# Patient Record
Sex: MALE | Race: WHITE | NOT HISPANIC OR LATINO | Employment: OTHER | URBAN - METROPOLITAN AREA
[De-identification: names, ages, dates, MRNs, and addresses within clinical notes are randomized per-mention and may not be internally consistent; named-entity substitution may affect disease eponyms.]

---

## 2017-08-17 DIAGNOSIS — Z12.5 ENCOUNTER FOR SCREENING FOR MALIGNANT NEOPLASM OF PROSTATE: ICD-10-CM

## 2017-08-17 DIAGNOSIS — E78.89 OTHER LIPOPROTEIN METABOLISM DISORDERS: ICD-10-CM

## 2017-08-17 DIAGNOSIS — R94.6 ABNORMAL RESULTS OF THYROID FUNCTION STUDIES: ICD-10-CM

## 2017-08-18 ENCOUNTER — LAB (OUTPATIENT)
Dept: LAB | Facility: MEDICAL CENTER | Age: 77
End: 2017-08-18
Payer: COMMERCIAL

## 2017-08-18 DIAGNOSIS — R94.6 ABNORMAL RESULTS OF THYROID FUNCTION STUDIES: ICD-10-CM

## 2017-08-18 DIAGNOSIS — Z12.5 ENCOUNTER FOR SCREENING FOR MALIGNANT NEOPLASM OF PROSTATE: ICD-10-CM

## 2017-08-18 DIAGNOSIS — E78.89 OTHER LIPOPROTEIN METABOLISM DISORDERS: ICD-10-CM

## 2017-08-18 LAB
ALBUMIN SERPL BCP-MCNC: 3.4 G/DL (ref 3.5–5)
ALP SERPL-CCNC: 50 U/L (ref 46–116)
ALT SERPL W P-5'-P-CCNC: 28 U/L (ref 12–78)
ANION GAP SERPL CALCULATED.3IONS-SCNC: 4 MMOL/L (ref 4–13)
AST SERPL W P-5'-P-CCNC: 23 U/L (ref 5–45)
BASOPHILS # BLD AUTO: 0.08 THOUSANDS/ΜL (ref 0–0.1)
BASOPHILS NFR BLD AUTO: 2 % (ref 0–1)
BILIRUB SERPL-MCNC: 0.6 MG/DL (ref 0.2–1)
BUN SERPL-MCNC: 19 MG/DL (ref 5–25)
CALCIUM SERPL-MCNC: 8.6 MG/DL (ref 8.3–10.1)
CHLORIDE SERPL-SCNC: 105 MMOL/L (ref 100–108)
CHOLEST SERPL-MCNC: 176 MG/DL (ref 50–200)
CO2 SERPL-SCNC: 28 MMOL/L (ref 21–32)
CREAT SERPL-MCNC: 0.66 MG/DL (ref 0.6–1.3)
EOSINOPHIL # BLD AUTO: 0.25 THOUSAND/ΜL (ref 0–0.61)
EOSINOPHIL NFR BLD AUTO: 5 % (ref 0–6)
ERYTHROCYTE [DISTWIDTH] IN BLOOD BY AUTOMATED COUNT: 13.9 % (ref 11.6–15.1)
GFR SERPL CREATININE-BSD FRML MDRD: 95 ML/MIN/1.73SQ M
GLUCOSE P FAST SERPL-MCNC: 82 MG/DL (ref 65–99)
HCT VFR BLD AUTO: 42.1 % (ref 36.5–49.3)
HDLC SERPL-MCNC: 81 MG/DL (ref 40–60)
HGB BLD-MCNC: 13.9 G/DL (ref 12–17)
LDLC SERPL CALC-MCNC: 86 MG/DL (ref 0–100)
LYMPHOCYTES # BLD AUTO: 0.87 THOUSANDS/ΜL (ref 0.6–4.47)
LYMPHOCYTES NFR BLD AUTO: 18 % (ref 14–44)
MCH RBC QN AUTO: 29.6 PG (ref 26.8–34.3)
MCHC RBC AUTO-ENTMCNC: 33 G/DL (ref 31.4–37.4)
MCV RBC AUTO: 90 FL (ref 82–98)
MONOCYTES # BLD AUTO: 0.66 THOUSAND/ΜL (ref 0.17–1.22)
MONOCYTES NFR BLD AUTO: 14 % (ref 4–12)
NEUTROPHILS # BLD AUTO: 2.95 THOUSANDS/ΜL (ref 1.85–7.62)
NEUTS SEG NFR BLD AUTO: 61 % (ref 43–75)
NRBC BLD AUTO-RTO: 0 /100 WBCS
PLATELET # BLD AUTO: 233 THOUSANDS/UL (ref 149–390)
PMV BLD AUTO: 10.1 FL (ref 8.9–12.7)
POTASSIUM SERPL-SCNC: 4.2 MMOL/L (ref 3.5–5.3)
PROT SERPL-MCNC: 6.7 G/DL (ref 6.4–8.2)
PSA SERPL-MCNC: 1.7 NG/ML (ref 0–4)
RBC # BLD AUTO: 4.7 MILLION/UL (ref 3.88–5.62)
SODIUM SERPL-SCNC: 137 MMOL/L (ref 136–145)
TRIGL SERPL-MCNC: 43 MG/DL
TSH SERPL DL<=0.05 MIU/L-ACNC: 4.93 UIU/ML (ref 0.36–3.74)
WBC # BLD AUTO: 4.82 THOUSAND/UL (ref 4.31–10.16)

## 2017-08-18 PROCEDURE — 36415 COLL VENOUS BLD VENIPUNCTURE: CPT

## 2017-08-18 PROCEDURE — 80053 COMPREHEN METABOLIC PANEL: CPT

## 2017-08-18 PROCEDURE — G0103 PSA SCREENING: HCPCS

## 2017-08-18 PROCEDURE — 84443 ASSAY THYROID STIM HORMONE: CPT

## 2017-08-18 PROCEDURE — 80061 LIPID PANEL: CPT

## 2017-08-18 PROCEDURE — 85025 COMPLETE CBC W/AUTO DIFF WBC: CPT

## 2017-08-28 ENCOUNTER — ALLSCRIPTS OFFICE VISIT (OUTPATIENT)
Dept: OTHER | Facility: OTHER | Age: 77
End: 2017-08-28

## 2017-08-31 ENCOUNTER — ALLSCRIPTS OFFICE VISIT (OUTPATIENT)
Dept: OTHER | Facility: OTHER | Age: 77
End: 2017-08-31

## 2017-10-18 ENCOUNTER — ALLSCRIPTS OFFICE VISIT (OUTPATIENT)
Dept: OTHER | Facility: OTHER | Age: 77
End: 2017-10-18

## 2017-10-24 NOTE — CONSULTS
Assessment  1  Benign prostatic hyperplasia (BPH) with urinary urge incontinence (600 01,788 31)   (N40 1,N39 41)    Plan  Benign prostatic hyperplasia (BPH) with urinary urge incontinence    · Oxybutynin Chloride ER 10 MG Oral Tablet Extended Release 24 Hour; Take 1  tablet daily   Rx By: Socorro Lainez; Dispense: 30 Days ; #:30 Tablet Extended Release 24 Hour; Refill: 1;For: Benign prostatic hyperplasia (BPH) with urinary urge incontinence; NAT = N; Verified Transmission to [address] [place] [address]; Last Updated By: System, SureScript[employer]; 10/18/2017 3:07:49 PM    Discussion/Summary  Discussion Summary:   60-year-old male with BPH and lower urinary tract symptoms  is to continue the tamsulosin  I will add oxybutynin for his overactive symptoms  Side effects were discussed  He will follow up in 6 weeks to re-evaluate his symptoms on the new medication  Self Referrals:   Self Referrals: No By Adin Jim      Chief Complaint  Chief Complaint Free Text Note Form: BPH= 1 7 (8/18/2017)      History of Present Illness  HPI: 60-year-old male presents for evaluation of BPH  The patient has had symptoms for several years but recently has been having worsening urgency and now occasional urge incontinence  He is not wearing any pads  He has been on tamsulosin and saw palmetto for well over a year  He denies any dysuria or gross hematuria  His PSAs have been within normal limits  He has no other complaints  Review of Systems  Complete-Male Urology:   Constitutional: No fever or chills, feels well, no tiredness, no recent weight gain or weight loss  Respiratory: No complaints of shortness of breath, no wheezing, no cough, no SOB on exertion, no orthopnea or PND  Cardiovascular: No complaints of slow heart rate, no fast heart rate, no chest pain, no palpitations, no leg claudication, no lower extremity     Gastrointestinal: No complaints of abdominal pain, no constipation, no nausea or vomiting, no diarrhea or bloody stools  Genitourinary: stream varies,-- Empty sensation-- (not always),-- incontinence-- (occ / no pads),-- feelings of urinary urgency-- (occ)-- and-- stream quality fair, but-- no dysuria,-- no urinary hesitancy-- and-- no hematuria--    The patient presents with complaints of nocturia (3/5(varies))  Musculoskeletal: No complaints of arthralgia, no myalgias, no joint swelling or stiffness, no limb pain or swelling  Integumentary: No complaints of skin rash or skin lesions, no itching, no skin wound, no dry skin  Hematologic/Lymphatic: No complaints of swollen glands, no swollen glands in the neck, does not bleed easily, no easy bruising  Neurological: No compliants of headache, no confusion, no convulsions, no numbness or tingling, no dizziness or fainting, no limb weakness, no difficulty walking  ROS Reviewed:   ROS reviewed  Active Problems  1  Allergic rhinitis due to pollen (477 0) (J30 1)   2  Benign prostatic hyperplasia (BPH) with urinary urge incontinence (600 01,788 31)   (N40 1,N39 41)   3  Elevated HDL (785 9) (E78 89)   4  Flu vaccine need (V04 81) (Z23)   5  Lipid screening (V77 91) (Z13 220)   6  Need for influenza vaccination (V04 81) (Z23)   7  Need for pneumococcal vaccine (V03 82) (Z23)   8  Prostate cancer screening (V76 44) (Z12 5)   9  Skin lesion (709 9) (L98 9)   10  TSH elevation (794 5) (R94 6)   11  Urticaria (708 9) (L50 9)    Past Medical History  1  Former smoker (V15 82) (O03 539)   2  History of basal cell carcinoma (V10 83) (Z85 828)   3  History of Screening PSA (prostate specific antigen) (V76 44) (Z12 5)   4  Urticaria (708 9) (L50 9)  Active Problems And Past Medical History Reviewed: The active problems and past medical history were reviewed and updated today  Surgical History  1  History of Hernia Repair   2  History of Mohs Micrographic Surgery Face  Surgical History Reviewed: The surgical history was reviewed and updated today  Family History  Mother    1  Family history of Mother  At Age 80  Father    2  Family history of Acute Myocardial Infarction (V17 3)   3  Family history of Father  At Age [de-identified]  Family History Reviewed: The family history was reviewed and updated today  Social History   · Being A Social Drinker   · Caffeine Use   · Never a smoker  Social History Reviewed: The social history was reviewed and updated today  Current Meds   1  Aspirin Low Dose 81 MG TABS; Therapy: 81YQM9464 to Recorded   2  [facility] [place] CAPS Recorded   3  Daily Value Multivitamin TABS; Therapy: (Recorded:2013) to Recorded   4  Fexofenadine HCl - 60 MG Oral Tablet; TABS PO X 90;   Therapy: 08TEF0182-  Requested for: 62Ygn7906; Status: NEED INFORMATION Ordered   5  PreviDent 5000 Booster 1 1 % Dental Paste; Therapy: 77Zgd5932 to (Last Rx:62Zzy6474)  Requested for: 59Klw8123 Ordered   6  Tamsulosin HCl - 0 4 MG Oral Capsule; take 1 capsule daily; Therapy: 44UGU5777 to (Evaluate:2018)  Requested for: 66Ckr8043; Last   Rx:77Rqm2382 Ordered  Medication List Reviewed: The medication list was reviewed and updated today  Allergies  1  No Known Drug Allergies    Vitals  Vital Signs    Recorded: 79NFF5424 02:22PM   Heart Rate 72   Systolic 269   Diastolic 70   Height 5 ft 9 5 in   Weight 160 lb    BMI Calculated 23 29   BSA Calculated 1 89     Physical Exam    Constitutional   General appearance: No acute distress, well appearing and well nourished  Pulmonary   Respiratory effort: No increased work of breathing or signs of respiratory distress  Cardiovascular   Examination of extremities for edema and/or varicosities: Normal     Abdomen   Abdomen: Non-tender, no masses  Liver and spleen: No hepatomegaly or splenomegaly  Genitourinary   Anus and perineum: Normal     Scrotum contents: Normal size, no masses  Epididymis: Normal, no masses  Testes: Normal testes, no masses      Urethral meatus: Normal, no lesions  Penis: Normal, no lesions  Digital rectal exam of prostate: Abnormal  -- 35 g, smooth, no nodules, nontender  Digital rectal exam of seminal vesicles: Normal size, no masses  Anus, perineum, and rectum: Normal     Musculoskeletal   Gait and station: Normal     Skin   Skin and subcutaneous tissue: Normal without rashes or lesions  Lymphatic   Palpation of lymph nodes in groin: Normal     Additional Exam:  Neuro exam nonfocal       Results/Data  (1) PSA (SCREEN) (Dx V76 44 Screen for Prostate Cancer) 94FFO6658 09:27AM [facility] Order Number: [id number]     Test Name Result Flag Reference   PROSTATE SPECIFIC ANTIGEN 1 7 ng/mL  0 0-4 0   American Urological Association Guidelines define biochemical recurrence of prostate cancer as a detectable or rising PSA value post-radical prostatectomy that is greater than or equal to 0 2 ng/mL with a second confirmatory level of greater than or equal to 0 2 ng/mL       Future Appointments    Date/Time Provider Specialty Site   12/13/2017 01:45 PM Jesus Alberto Perez, [facility] Urology [facility]   10/24/2017 01:00 PM Mary Jo Villatoro, Nurse Schedule  [facility] [address]     Signatures   Electronically signed by : Candance Netter, M D ; Oct 23 2017  4:54PM EST                       (Author)

## 2017-12-13 ENCOUNTER — ALLSCRIPTS OFFICE VISIT (OUTPATIENT)
Dept: OTHER | Facility: OTHER | Age: 77
End: 2017-12-13

## 2017-12-15 NOTE — PROGRESS NOTES
Assessment  1  Benign prostatic hyperplasia (BPH) with urinary urge incontinence (600 01,788 31) (N40 1,N39 41)    Plan  Benign prostatic hyperplasia (BPH) with urinary urge incontinence    · Oxybutynin Chloride ER 10 MG Oral Tablet Extended Release 24 Hour; Take 1tablet daily   Rx By: Louise Wright; Dispense: 90 Days ; #:90 Tablet Extended Release 24 Hour; Refill: 3;For: Benign prostatic hyperplasia (BPH) with urinary urge incontinence; NAT = N; Verified Transmission to [address] [place] [address]; Last Updated By: System, SureScripts; 12/13/2017 2:10:08 PM   · Measure Post Void Residual - POC; Status:Active - Perform Order; Requestedfor:73Ttu6506;    Perform: In Office; Due:34Fmh6529; Ordered; For:Benign prostatic hyperplasia (BPH) with urinary urge incontinence; Ordered By:Obdulia Bray;   · Follow-up visit in 6 months Evaluation and Treatment  Follow-up  Status: Complete Done: 40FGH3225   Ordered; For: Benign prostatic hyperplasia (BPH) with urinary urge incontinence; Ordered By: Louise Wright Performed:  Due: 77AWQ1299; Last Updated By: Rolf Garcia; 12/13/2017 2:18:06 PM    Discussion/Summary  Discussion Summary:   BPH and urinary urgencyGuero is a 70 y/o male being managed by Dr Chery Situ  He is doing well and is pleased with the improvement of his urinary symptoms  He will continue to take Flomax and Oxybutynin daily  He will return in 6 months for follow up with PVR  Instructed to call with any issues  All questions answered  Chief Complaint  Chief Complaint Free Text Note Form: BPH with LUTS      History of Present Illness  HPI: 70 y/o male with BPH and urgency presents today for follow up  He was recently evaluated and prescribed Oxybutynin in addition to Flomax  He has noticed an improvement with urinary urgency and frequency  He has nocturia 2 times a night  He is very pleased and denies any side effects        Review of Systems  Complete-Male Urology:  Constitutional: No fever or chills, feels well, no tiredness, no recent weight gain or weight loss  Respiratory: No complaints of shortness of breath, no wheezing, no cough, no SOB on exertion, no orthopnea or PND  Cardiovascular: No complaints of slow heart rate, no fast heart rate, no chest pain, no palpitations, no leg claudication, no lower extremity  Gastrointestinal: No complaints of abdominal pain, no constipation, no nausea or vomiting, no diarrhea or bloody stools  Genitourinary: Empty sensation,-- feelings of urinary urgency-- (getting better)-- and-- stream quality fair, but-- no dysuria,-- no urinary hesitancy,-- no hematuria-- and-- no incontinence--   The patient presents with complaints of nocturia (2x)  Musculoskeletal: No complaints of arthralgia, no myalgias, no joint swelling or stiffness, no limb pain or swelling  Integumentary: No complaints of skin rash or skin lesions, no itching, no skin wound, no dry skin  Hematologic/Lymphatic: No complaints of swollen glands, no swollen glands in the neck, does not bleed easily, no easy bruising  Neurological: No compliants of headache, no confusion, no convulsions, no numbness or tingling, no dizziness or fainting, no limb weakness, no difficulty walking  ROS Reviewed:   ROS reviewed  Active Problems  1  Allergic rhinitis due to pollen (477 0) (J30 1)   2  Benign prostatic hyperplasia (BPH) with urinary urge incontinence (600 01,788 31) (N40 1,N39 41)   3  Elevated HDL (785 9) (E78 89)   4  Flu vaccine need (V04 81) (Z23)   5  Lipid screening (V77 91) (Z13 220)   6  Need for influenza vaccination (V04 81) (Z23)   7  Need for pneumococcal vaccine (V03 82) (Z23)   8  Prostate cancer screening (V76 44) (Z12 5)   9  Skin lesion (709 9) (L98 9)   10  TSH elevation (794 5) (R94 6)   11  Urticaria (708 9) (L50 9)    Past Medical History  1  Former smoker (V15 82) (I89 471)   2  History of basal cell carcinoma (V10 83) (Z85 828)   3   History of Screening PSA (prostate specific antigen) (V76 44) (Z12 5)   4  Urticaria (708 9) (L50 9)  Active Problems And Past Medical History Reviewed: The active problems and past medical history were reviewed and updated today  Surgical History  1  History of Hernia Repair   2  History of Mohs Micrographic Surgery Face  Surgical History Reviewed: The surgical history was reviewed and updated today  Family History  Mother    1  Family history of Mother  At Age 80  Father    2  Family history of Acute Myocardial Infarction (V17 3)   3  Family history of Father  At Age [de-identified]  Family History Reviewed: The family history was reviewed and updated today  Social History   · Being A Social Drinker   · Caffeine Use   · Never a smoker  Social History Reviewed: The social history was reviewed and updated today  The social history was reviewed and is unchanged  Current Meds   1  Aspirin Low Dose 81 MG TABS; Therapy: 02KNH7665 to Recorded   2  [facility] [place] [facility] Recorded   3  Daily Value Multivitamin TABS; Therapy: (Recorded:2013) to Recorded   4  Fexofenadine HCl - 60 MG Oral Tablet; TABS PO X 90; Therapy: 49RJA1093-  Requested for: 45Cgs2692; Status: NEED INFORMATION Ordered   5  Oxybutynin Chloride ER 10 MG Oral Tablet Extended Release 24 Hour; Take 1 tablet daily; Therapy: 99MXP0071 to (Evaluate:15Qyu5826)  Requested for: 87PPC7972; Last Rx:94Zpw7225 Ordered   6  PreviDent 5000 Booster 1 1 % Dental Paste; Therapy: 23Gcq4558 to (Last Rx:88Bzd4878)  Requested for: 84Vlu9068 Ordered   7  Tamsulosin HCl - 0 4 MG Oral Capsule; take 1 capsule daily; Therapy: 36GIS0289 to (Evaluate:2018)  Requested for: 72Oxl5117; Last Rx:88Nqq0515 Ordered  Medication List Reviewed: The medication list was reviewed and updated today  Allergies  1   No Known Drug Allergies    Vitals  Vital Signs    Recorded: 85CDO7358 01:55PM   Heart Rate 80   Systolic 901   Diastolic 70   Height 5 ft 9 5 in   Weight 161 lb    BMI Calculated 23 43   BSA Calculated 1 89     Physical Exam   Constitutional  General appearance: No acute distress, well appearing and well nourished  Pulmonary  Respiratory effort: No increased work of breathing or signs of respiratory distress  Cardiovascular  Palpation of heart: Normal PMI, no thrills  Examination of extremities for edema and/or varicosities: Normal    Abdomen  Abdomen: Non-tender, no masses  Musculoskeletal  Gait and station: Normal    Skin  Skin and subcutaneous tissue: Normal without rashes or lesions  Future Appointments    Date/Time Provider Specialty Site   06/13/2018 11:15 AM Kai Pearl [facility] Urology [facility] FOR UROLOGY [address] [place] [address]   11/05/2018 09:00 AM JULIANE Bennett   [address]     Signatures   Electronically signed by : [facility] ; Dec 13 2017  3:26PM EST                       (Author)    Electronically signed by : Monique Reeves MD; Dec 13 2017  3:50PM EST

## 2018-01-11 NOTE — PROGRESS NOTES
Assessment   1  Benign prostatic hyperplasia with lower urinary tract symptoms, unspecified morphology   (600 01) (N40 1)  2  Encounter for preventive health examination (V70 0) (Z00 00)    Plan  Benign prostatic hyperplasia with lower urinary tract symptoms, unspecified morphology    · Start: Tamsulosin HCl - 0 4 MG Oral Capsule; take 1 capsule daily  Enlarged prostate without lower urinary tract symptoms (luts)    · Stop: Terazosin HCl - 2 MG Oral Capsule  Health Maintenance    · *VB - Fall Risk Assessment  (Dx V80 09 Screen for Neurologic Disorder);  Status:Complete;   Done: 75JBU9571 12:00AM   · *VB - Urinary Incontinence Screen (Dx V81 6 Screen for UI); Status:Complete;   Done:  82OLC8841 09:51AM   · *VB-Depression Screening; Status:Complete;   Done: 55RXO5731 09:51AM    Discussion/Summary  Impression: health maintenance visit  Currently, he eats a healthy diet and has an adequate exercise regimen  Prostate cancer screening: prostate cancer screening is current and prostate cancer screening is managed by Me  Testicular cancer screening: the risks and benefits of testicular cancer screening were discussed  Colorectal cancer screening: colorectal cancer screening is current and colorectal cancer screening is managed by Melanie Gutierrez  He was advised to be evaluated by an ophthalmologist and [place] (early cataracts)  Advice and education were given regarding nutrition, aerobic exercise and weight bearing exercise  Having increased LUTS with his prostate  Will switch to tamsulosin, if not working well, refer to uro  History of Present Illness  HM, Adult Male:   General Health: The patient's health since the last visit is described as good  Lifestyle:  He consumes a diverse and healthy diet  He does not have any weight concerns  He exercises regularly  He does not use tobacco  He denies alcohol use  He denies drug use  Screening: cancer screening reviewed and current     metabolic screening reviewed and current  risk screening reviewed and current  Benign Prostatic Hyperplasia (Brief): The patient is being seen for a routine clinic follow-up of benign prostatic hyperplasia  Symptoms:  sensation of incomplete bladder emptying, urinary urgency and post-void dribbling  The patient is currently experiencing symptoms  No associated symptoms are reported  Current treatment includes terazosin (Hytrin)  By report, there is good compliance with treatment, good tolerance of treatment and fair symptom control  Review of Systems    Constitutional: No fever or chills, feels well, no tiredness, no recent weight gain or weight loss  Eyes: No complaints of eye pain, no red eyes, no discharge from eyes, no itchy eyes  ENT: no complaints of earache, no hearing loss, no nosebleeds, no nasal discharge, no sore throat, no hoarseness  Cardiovascular: No complaints of slow heart rate, no fast heart rate, no chest pain, no palpitations, no leg claudication, no lower extremity  Respiratory: No complaints of shortness of breath, no wheezing, no cough, no SOB on exertion, no orthopnea or PND  Gastrointestinal: No complaints of abdominal pain, no constipation, no nausea or vomiting, no diarrhea or bloody stools  Genitourinary: No complaints of dysuria, no incontinence, no hesitancy, no nocturia, no genital lesion, no testicular pain  Musculoskeletal: No complaints of arthralgia, no myalgias, no joint swelling or stiffness, no limb pain or swelling  Integumentary: No complaints of skin rash or skin lesions, no itching, no skin wound, no dry skin  Neurological: No compliants of headache, no confusion, no convulsions, no numbness or tingling, no dizziness or fainting, no limb weakness, no difficulty walking  Psychiatric: Is not suicidal, no sleep disturbances, no anxiety or depression, no change in personality, no emotional problems     Endocrine: No complaints of proptosis, no hot flashes, no muscle weakness, no erectile dysfunction, no deepening of the voice, no feelings of weakness  Hematologic/Lymphatic: No complaints of swollen glands, no swollen glands in the neck, does not bleed easily, no easy bruising  Preventive Quality 65 and Older: Falls Risk: The patient fell 0 times in the past 12 months  The patient is currently asymptomatic Symptoms Include: The patient is currently experiencing urinary symptoms  Urinary Incontinence Symptoms includes: urinary urgency and post-void dribbling      Over the past 2 weeks, how often have you been bothered by the following problems? 1 ) Little interest or pleasure in doing things? Not at all    2 ) Feeling down, depressed or hopeless? Not at all    3 ) Trouble falling asleep or sleeping too much? Not at all    4 ) Feeling tired or having little energy? Not at all    5 ) Poor appetite or overeating? Not at all    6 ) Feeling bad about yourself, or that you are a failure, or have let yourself or your family down? Not at all    7 ) Trouble concentrating on things, such as reading a newspaper or watching television? Not at all    8 ) Moving or speaking so slowly that other people could have noticed, or the opposite, moving or speaking faster than usual? Not at all  How difficult have these problems made it for you to do your work, take care of things at home, or get along with people? Not at all  Score       Active Problems   1  Allergic rhinitis due to pollen (477 0) (J30 1)  2  Lipid screening (V77 91) (Z13 220)  3  Need for influenza vaccination (V04 81) (Z23)  4  Skin lesion (709 9) (L98 9)  5  TSH elevation (794 5) (R94 6)  6  Urticaria (708 9) (L50 9)    Past Medical History    · Former smoker (N45 52) (I83 549)   · History of basal cell carcinoma (V10 83) (A87 024)   · History of Screening PSA (prostate specific antigen) (V76 44) (Z12 5)   · Urticaria (708 9) (L50 9)    The past medical history was reviewed and updated today        Surgical History    · History of Hernia Repair   · History of Mohs Micrographic Surgery Face    The surgical history was reviewed and updated today  Family History  Mother    · Family history of Mother  At Age 80  Father    · Family history of Acute Myocardial Infarction (V17 3)   · Family history of Father  At Age [de-identified]    The family history was reviewed and updated today  Social History    · Being A Social Drinker   · Caffeine Use   · Never a smoker  The social history was reviewed and updated today  The social history was reviewed and is unchanged  Current Meds  1  Aspirin Low Dose 81 MG TABS; Therapy: 93MZI5703 to Recorded  2  [facility] [place] CAPS Recorded  3  Daily Value Multivitamin TABS; Therapy: (Recorded:2013) to Recorded  4  Fexofenadine HCl - 60 MG Oral Tablet; TABS PO X 90;   Therapy: 48TQV4502-  Requested for: 87Imt8102; Status: NEED INFORMATION   Ordered  5  PreviDent 5000 Booster 1 1 % Dental Paste; Therapy: 66Uje2919 to (Last Rx:25Rhc2058)  Requested for: 22Cjy8727 Ordered  6  Terazosin HCl - 2 MG Oral Capsule; TAKE ONE CAPSULE BY MOUTH ONCE DAILY AT   BEDTIME NIGHTLY; Therapy: 91Rxw9658 to (Evaluate:47Pxv8136)  Requested for: 48UVW4226; Last   Rx:78Ndk1103 Ordered    The medication list was reviewed and updated today  Allergies   1  No Known Drug Allergies    Vitals   Recorded: 98ESC6736 76:29EO   Systolic 225   Diastolic 70   Heart Rate 70   Respiration 16   Height 5 ft 9 5 in   Weight 159 lb    BMI Calculated 23 14   BSA Calculated 1 88     Physical Exam    Constitutional   General appearance: No acute distress, well appearing and well nourished  Eyes   Conjunctiva and lids: No erythema, swelling or discharge  Pupils and irises: Equal, round, reactive to light  Ophthalmoscopic examination: Normal fundi and optic discs      Ears, Nose, Mouth, and Throat   External inspection of ears and nose: Normal     Otoscopic examination: Tympanic membranes translucent with normal light reflex  Canals patent without erythema  Hearing: Normal     Nasal mucosa, septum, and turbinates: Normal without edema or erythema  Lips, teeth, and gums: Normal, good dentition  Oropharynx: Normal with no erythema, edema, exudate or lesions  Neck   Neck: Supple, symmetric, trachea midline, no masses  Thyroid: Normal, no thyromegaly  Pulmonary   Respiratory effort: No increased work of breathing or signs of respiratory distress  Percussion of chest: Normal     Palpation of chest: Normal     Auscultation of lungs: Clear to auscultation  Cardiovascular   Palpation of heart: Normal PMI, no thrills  Auscultation of heart: Normal rate and rhythm, normal S1 and S2, no murmurs  Carotid pulses: 2+ bilaterally  Abdominal aorta: Normal     Femoral pulses: 2+ bilaterally  Pedal pulses: 2+ bilaterally  Examination of extremities for edema and/or varicosities: Normal     Chest   Breasts: Normal, no dimpling or skin changes appreciated  Palpation of breasts and axillae: Normal, no masses palpated  Chest: Normal     Abdomen   Abdomen: Non-tender, no masses  Liver and spleen: No hepatomegaly or splenomegaly  Examination for hernias: No hernias appreciated  Anus, perineum, and rectum: Normal sphincter tone, no masses, no prolapse  Stool sample for occult blood: Negative  Genitourinary   Scrotal contents: Normal testes, no masses  Penis: Normal, no lesions  Digital rectal exam of prostate: Abnormal   symmetrically enlarged  Lymphatic   Palpation of lymph nodes in neck: No lymphadenopathy  Palpation of lymph nodes in axillae: No lymphadenopathy  Palpation of lymph nodes in groin: No lymphadenopathy  Palpation of lymph nodes in other areas: No lymphadenopathy  Musculoskeletal   Gait and station: Normal     Inspection/palpation of digits and nails: Normal without clubbing or cyanosis      Inspection/palpation of joints, bones, and muscles: Normal     Range of motion: Normal     Stability: Normal     Muscle strength/tone: Normal     Skin   Skin and subcutaneous tissue: Normal without rashes or lesions  Palpation of skin and subcutaneous tissue: Normal turgor  Neurologic   Cranial nerves: Cranial nerves 2-12 intact  Reflexes: 2+ and symmetric  Sensation: No sensory loss  Psychiatric   Judgment and insight: Normal     Orientation to person, place and time: Normal     Recent and remote memory: Intact  Mood and affect: Normal        Results/Data  *VB - Urinary Incontinence Screen (Dx V81 6 Screen for UI) 83LIM8227 09:51AM [employer]     Test Name Result Flag Reference   Urinary Incontinence Assessment 00ZYV4561 O      *VB-Depression Screening 83OHZ0842 09:51AM [employer]     Test Name Result Flag Reference   Depression Scale Result      Depression Screen - Negative For Symptoms     *VB - Fall Risk Assessment  (Dx V80 09 Screen for Neurologic Disorder) 93Xcv5784 12:00AM [employer]     Test Name Result Flag Reference   Fall Risk Assessment 53HZG8403         Health Management  Health Maintenance   Medicare Annual Wellness Visit; every 1 year; Next Due: 11Aug2015; Overdue    Future Appointments    Date/Time Provider Specialty Site   08/28/2017 09:00 AM JULIANE Chua   [address] [facility]     Signatures   Electronically signed by : JULIANE Coley ; Aug 16 2016  9:59AM EST                       (Author)

## 2018-01-11 NOTE — PROGRESS NOTES
Assessment   1  Benign prostatic hyperplasia with lower urinary tract symptoms, unspecified morphology   (600 01) (N40 1)  2  Encounter for preventive health examination (V70 0) (Z00 00)    Plan  Benign prostatic hyperplasia with lower urinary tract symptoms, unspecified morphology    · Start: Tamsulosin HCl - 0 4 MG Oral Capsule; take 1 capsule daily  Enlarged prostate without lower urinary tract symptoms (luts)    · Stop: Terazosin HCl - 2 MG Oral Capsule  Health Maintenance    · *VB - Fall Risk Assessment  (Dx V80 09 Screen for Neurologic Disorder);  Status:Complete;   Done: 01JUB8530 12:00AM   · *VB - Urinary Incontinence Screen (Dx V81 6 Screen for UI); Status:Complete;   Done:  68LHA4615 09:51AM   · *VB-Depression Screening; Status:Complete;   Done: 38MWN4107 09:51AM    Discussion/Summary  Impression: Subsequent Annual Wellness Visit, with preventive exam as well as age and risk appropriate counseling completed  Cardiovascular screening and counseling: the risks and benefits of screening were discussed, screening is current, counseling was given on maintaining a healthy diet, counseling was given on maintaining a healthy weight and counseling was given on ways to improve cholesterol  Diabetes screening and counseling: screening is current, counseling was given on maintaining a healthy diet and counseling was given on maintaining a healthy weight  Colorectal cancer screening and counseling: screening is current and colorectal cancer screening due every 10 year(s)  Prostate cancer screening and counseling: screening is current  Osteoporosis screening and counseling: the risks and benefits of screening were discussed  Abdominal aortic aneurysm screening and counseling: screening not indicated  Glaucoma screening and counseling: screening is current  HIV screening and counseling: the risks and benefits of screening were discussed   Immunizations: influenza vaccine is up to date this year, the lifetime pneumococcal vaccine has been completed, hepatitis B vaccination series is not indicated at this time due to the patient's low risk of wilfredo the disease and Zostavax vaccination up to date  Advance Directive Planning: complete and up to date  Patient Discussion: plan discussed with the patient, follow-up visit needed in one year  History of Present Illness  Welcome to Medicare and Wellness Visits: The patient is being seen for the subsequent annual wellness visit  Medicare Screening and Risk Factors   Medicare Screening Tests Risk Questions   Abdominal aortic aneurysm risk assessment: over 72years of age  Osteoporosis risk assessment: none indicated  HIV risk assessment: none indicated  Drug and Alcohol Use: The patient has never smoked cigarettes  The patient reports rare alcohol use  He has never used illicit drugs  Diet and Physical Activity: Current diet includes well balanced meals, low fat food choices, low carbohydrate food choices and low salt food choices  He exercises daily  Exercise: walking 40 minutes per day  Mood Disorder and Cognitive Impairment Screening: He denies feeling down, depressed, or hopeless over the past two weeks  He denies feeling little interest or pleasure in doing things over the past two weeks  Cognitive impairment screening: denies difficulty learning/retaining new information, denies difficulty handling complex tasks, denies difficulty with reasoning, denies difficulty with spatial ability and orientation, denies difficulty with language and denies difficulty with behavior  Functional Ability/Level of Safety: Hearing is normal bilaterally  The patient is currently able to do activities of daily living without limitations, able to do instrumental activities of daily living without limitations, able to participate in social activities without limitations and able to drive without limitations   Activities of daily living details: does not need help using the phone, no transportation help needed, does not need help shopping, no meal preparation help needed, does not need help doing housework, does not need help doing laundry, does not need help managing medications and does not need help managing money  Fall risk factors:  no polypharmacy, no alcohol use, no mobility impairment, no antidepressant use, no deconditioning, no postural hypotension, no sedative use, no visual impairment, no urinary incontinence, no antihypertensive use, no cognitive impairment, up and go test was normal and no previous fall  Home safety risk factors:  no unfamiliar surroundings, no loose rugs, no poor household lighting, no uneven floors, no household clutter, grab bars in the bathroom and handrails on the stairs  Advance Directives: Advance directives: living will, durable power of  for health care directives and advance directives  end of life decisions were not reviewed with the patient  Co-Managers and Medical Equipment/Suppliers: See Patient Care Team      Patient Care Team    Care Team Member Role Specialty Office Number   2 [facility] [phone]   Rosie Arcos MD  Gastroenterology Adult  J DO  Dermatology [phone]   [facility]   [phone]     Active Problems   1  Allergic rhinitis due to pollen (477 0) (J30 1)  2  Lipid screening (V77 91) (Z13 220)  3  Need for influenza vaccination (V04 81) (Z23)  4  Skin lesion (709 9) (L98 9)  5  TSH elevation (794 5) (R94 6)  6  Urticaria (708 9) (L50 9)    Past Medical History    · Former smoker (E02 00) (I26 706)   · History of basal cell carcinoma (V10 83) (D72 895)   · History of Screening PSA (prostate specific antigen) (V76 44) (Z12 5)   · Urticaria (708 9) (L50 9)    The active problems and past medical history were reviewed and updated today        Surgical History    · History of Hernia Repair   · History of Mohs Micrographic Surgery Face    The surgical history was reviewed and updated today  Family History  Mother    · Family history of Mother  At Age 80  Father    · Family history of Acute Myocardial Infarction (V17 3)   · Family history of Father  At Age [de-identified]    The family history was reviewed and updated today  Social History    · Being A Social Drinker   · Caffeine Use   · Never a smoker  The social history was reviewed and updated today  The social history was reviewed and is unchanged  Current Meds  1  Aspirin Low Dose 81 MG TABS; Therapy: 97JJC2068 to Recorded  2  [facility] [place] CAPS Recorded  3  Daily Value Multivitamin TABS; Therapy: (Recorded:2013) to Recorded  4  Fexofenadine HCl - 60 MG Oral Tablet; TABS PO X 90;   Therapy: 79AJF5793-  Requested for: 50Yjp2316; Status: NEED INFORMATION   Ordered  5  PreviDent 5000 Booster 1 1 % Dental Paste; Therapy: 24Wux4142 to (Last Rx:53Xwt3872)  Requested for: 07Elf4657 Ordered  6  Terazosin HCl - 2 MG Oral Capsule; TAKE ONE CAPSULE BY MOUTH ONCE DAILY AT   BEDTIME NIGHTLY; Therapy: 14Yap8390 to (Evaluate:41Nnn1922)  Requested for: 95RGA0896; Last   Rx:70Fpm5443 Ordered    The medication list was reviewed and updated today  Allergies   1  No Known Drug Allergies    Immunizations   1    Influenza  30-Sep-2015    PPSV  15-Dec-2008    Tdap  2012     Vitals  Signs    Systolic: 330  Diastolic: 70  Heart Rate: 70  Respiration: 16  Height: 5 ft 9 5 in  Weight: 159 lb   BMI Calculated: 23 14  BSA Calculated: 1 88    Procedure    Procedure: Audiometry: Normal bilaterally  Hearing in the right ear: 20 decibals at 500 hertz, 20 decibals at 1000 hertz, 20 decibals at 2000 hertz and 20 decibals at 4000 hertz  Hearing in the left ear: 20 decibals at 500 hertz, 20 decibals at 1000 hertz, 20 decibals at 2000 hertz and 20 decibals at 4000 hertz          Procedure:   Results: 20/40 in both eyes with corrective device, 20/40 in the right eye with corrective device, 20/70 in the left eye with corrective device      Health Management  Health Maintenance   Medicare Annual Wellness Visit; every 1 year; Next Due: 11Aug2015; Overdue    Future Appointments    Date/Time Provider Specialty Site   08/28/2017 09:00 AM JULIANE Preston   [address] [facility]     Signatures   Electronically signed by : JULIANE Eng ; Aug 16 2016  9:58AM EST                       (Author)

## 2018-01-12 VITALS
WEIGHT: 158.5 LBS | RESPIRATION RATE: 18 BRPM | SYSTOLIC BLOOD PRESSURE: 128 MMHG | HEART RATE: 64 BPM | DIASTOLIC BLOOD PRESSURE: 74 MMHG

## 2018-01-13 VITALS
HEIGHT: 70 IN | HEART RATE: 72 BPM | BODY MASS INDEX: 22.9 KG/M2 | WEIGHT: 160 LBS | SYSTOLIC BLOOD PRESSURE: 120 MMHG | DIASTOLIC BLOOD PRESSURE: 70 MMHG

## 2018-01-22 VITALS
WEIGHT: 161 LBS | HEART RATE: 80 BPM | SYSTOLIC BLOOD PRESSURE: 130 MMHG | HEIGHT: 70 IN | DIASTOLIC BLOOD PRESSURE: 70 MMHG | BODY MASS INDEX: 23.05 KG/M2

## 2018-01-23 NOTE — PROGRESS NOTES
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)  3  Flu vaccine need (V04 81) (Z23)  4  Lipid screening (V77 91) (Z13 220)  5  Need for influenza vaccination (V04 81) (Z23)  6  Need for pneumococcal vaccine (V03 82) (Z23)  7  Prostate cancer screening (V76 44) (Z12 5)  8  Skin lesion (709 9) (L98 9)  9  TSH elevation (794 5) (R94 6)  10  Urticaria (708 9) (L50 9)    Benign prostatic hyperplasia (BPH) with urinary urge incontinence (600 01) (N40 1)          Current Meds  1  Aspirin Low Dose 81 MG TABS; Therapy: 98QIT5667 to Recorded  2  [facility] [place] CAPS Recorded  3  Daily Value Multivitamin TABS; Therapy: (Recorded:11Jun2013) to Recorded  4  Fexofenadine HCl - 60 MG Oral Tablet; TABS PO X 90;   Therapy: 33PDA4073-  Requested for: 47Bfg2809; Status: NEED INFORMATION   Ordered  5  PreviDent 5000 Booster 1 1 % Dental Paste; Therapy: 64Qid9567 to (Last Rx:55Gnl6209)  Requested for: 70Gxu7162 Ordered  6  Tamsulosin HCl - 0 4 MG Oral Capsule; take 1 capsule daily; Therapy: 24PON2200 to (Evaluate:20Jan2018)  Requested for: 81Fgh8126; Last   Rx:09Jmz6880 Ordered    Allergies   1  No Known Drug Allergies    Procedure    Procedure: cerumen removal    Indication: cerumen impaction in both ears  Prep: Debrox was placed in the canal prior to the procedure  Procedure Note: The procedure was performed by nurse  A otoscope was placed in the ear canal(s) to visualize the ear canal debris  The ear was cleaned by using warm water irrigation and water  The procedure was successful  Post-Procedure:   Patient Status: the patient tolerated the procedure well  Complications: there were no complications  Patient instructions: dry ear precautions  Follow-up as needed        Future Appointments    Date/Time Provider Specialty Site 06/13/2018 11:15 AM Lesia Chung, [address]  [facility] Lovely Ashsih   11/05/2018 09:00 AM JULIANE Lucas   [address] [facility]     Signatures   Electronically signed by : JULIANE Yanez ; Dec 26 2017  4:13PM EST                       (Author)

## 2018-02-08 DIAGNOSIS — N40.1 BENIGN PROSTATIC HYPERPLASIA WITH URINARY HESITANCY: Primary | ICD-10-CM

## 2018-02-08 DIAGNOSIS — R39.11 BENIGN PROSTATIC HYPERPLASIA WITH URINARY HESITANCY: Primary | ICD-10-CM

## 2018-02-08 RX ORDER — TAMSULOSIN HYDROCHLORIDE 0.4 MG/1
CAPSULE ORAL
Qty: 90 CAPSULE | Refills: 1 | Status: SHIPPED | OUTPATIENT
Start: 2018-02-08 | End: 2018-07-30 | Stop reason: SDUPTHER

## 2018-03-19 DIAGNOSIS — R39.15 URINARY URGENCY: Primary | ICD-10-CM

## 2018-03-19 RX ORDER — OXYBUTYNIN CHLORIDE 10 MG/1
TABLET, EXTENDED RELEASE ORAL
Qty: 30 TABLET | Refills: 1 | Status: SHIPPED | OUTPATIENT
Start: 2018-03-19 | End: 2018-05-03

## 2018-05-02 DIAGNOSIS — N39.41 BENIGN PROSTATIC HYPERPLASIA (BPH) WITH URINARY URGE INCONTINENCE: Primary | ICD-10-CM

## 2018-05-02 DIAGNOSIS — N40.1 BENIGN PROSTATIC HYPERPLASIA (BPH) WITH URINARY URGE INCONTINENCE: Primary | ICD-10-CM

## 2018-05-02 NOTE — TELEPHONE ENCOUNTER
Demarco Reaves from [facility] (2nd # [phone]) called about mediciation oxybutynin ER - it is a tier level exception  Reference #[id number] needs referral

## 2018-05-03 RX ORDER — OXYBUTYNIN CHLORIDE 5 MG/1
TABLET ORAL
Qty: 60 TABLET | Refills: 6 | Status: SHIPPED | OUTPATIENT
Start: 2018-05-03 | End: 2018-06-13 | Stop reason: SDUPTHER

## 2018-05-03 NOTE — TELEPHONE ENCOUNTER
Norbert Mujica  This patients Oxybutynin is approved at the non-preferred copay good from 3/1/18-4/30/19,  the tier exception was denied though    Thanks  Nacho Nath

## 2018-05-03 NOTE — TELEPHONE ENCOUNTER
Called to discuss medication with patient and he said he was told that 2 - 5 mg is no copay but 10mg is $50 copay    Will reorder script

## 2018-06-13 ENCOUNTER — OFFICE VISIT (OUTPATIENT)
Dept: UROLOGY | Facility: AMBULATORY SURGERY CENTER | Age: 78
End: 2018-06-13
Payer: COMMERCIAL

## 2018-06-13 VITALS
DIASTOLIC BLOOD PRESSURE: 78 MMHG | BODY MASS INDEX: 22.62 KG/M2 | SYSTOLIC BLOOD PRESSURE: 110 MMHG | HEIGHT: 70 IN | WEIGHT: 158 LBS

## 2018-06-13 DIAGNOSIS — N40.1 BENIGN PROSTATIC HYPERPLASIA (BPH) WITH URINARY URGE INCONTINENCE: Primary | ICD-10-CM

## 2018-06-13 DIAGNOSIS — N39.41 BENIGN PROSTATIC HYPERPLASIA (BPH) WITH URINARY URGE INCONTINENCE: Primary | ICD-10-CM

## 2018-06-13 LAB — POST-VOID RESIDUAL VOLUME, ML POC: 145 ML

## 2018-06-13 PROCEDURE — 51798 US URINE CAPACITY MEASURE: CPT | Performed by: NURSE PRACTITIONER

## 2018-06-13 PROCEDURE — 99213 OFFICE O/P EST LOW 20 MIN: CPT | Performed by: NURSE PRACTITIONER

## 2018-06-13 RX ORDER — ERGOCALCIFEROL (VITAMIN D2) 10 MCG
TABLET ORAL
COMMUNITY

## 2018-06-13 RX ORDER — OXYBUTYNIN CHLORIDE 5 MG/1
TABLET ORAL
Qty: 60 TABLET | Refills: 11 | Status: SHIPPED | OUTPATIENT
Start: 2018-06-13 | End: 2018-11-05 | Stop reason: ALTCHOICE

## 2018-06-13 NOTE — PROGRESS NOTES
6/13/2018    [facility]  1940  [id number]        Assessment  BPH with urinary urgency    Discussion  Darlene Guevara is a 69 y o  male being managed by Raine Marmolejo  A bladder ultrasound was obtained today in the office and PVR was 145 ml  He then voided a total of 150ml  He is comfortable with his current urinary pattern  He will continue to take Flomax daily  He wishes to take oxybutynin 5mg daily  He will return in 1 year for follow up with a PVR  He was instructed to call with any issues  History of Present Illness  69 y o  male with a history of BPH and urinary urgency presents today for 6 month follow up  He continues to take Flomax and Oxybutynin daily  He denies any lower urinary tract symptoms except for urinary frequency and nocturia 3 times a night  He does not find this bothersome  He has a fair stream  He feels medical management is working for him  He denies any changes in his overall health  Review of Systems  Review of Systems   Constitutional: Negative  HENT: Negative  Respiratory: Negative  Cardiovascular: Negative  Gastrointestinal: Negative  Genitourinary:        As per HPI   Musculoskeletal: Negative  Skin: Negative  Neurological: Negative  Hematological: Negative  AUA SYMPTOM SCORE      Most Recent Value   AUA SYMPTOM SCORE   How often have you had a sensation of not emptying your bladder completely after you finished urinating? 2   How often have you had to urinate again less than two hours after you finished urinating? 3   How often have you found you stopped and started again several times when you urinate? 1   How often have you found it difficult to postpone urination? 4   How often have you had a weak urinary stream?  2   How often have you had to push or strain to begin urination? 1   How many times did you most typically get up to urinate from the time you went to bed at night until the time you got up in the morning?   2   Quality of Life: If you were to spend the rest of your life with your urinary condition just the way it is now, how would you feel about that?  3   AUA SYMPTOM SCORE  15        Urinary Incontinence Screening      Most Recent Value   Urinary Incontinence   Urinary Incontinence? No   Incomplete emptying? No   Urinary frequency? Yes   Urinary urgency? Yes   Urinary hesitancy? No   Dysuria (painful difficult urination)? No   Nocturia (waking up to use the bathroom)? Yes [3x]   Straining (having to push to go)? No   Weak stream?  No   Intermittent stream?  No   Post void dribbling? No            Past Medical History  Past Medical History:   Diagnosis Date    Basal cell carcinoma        Past Surgical History  Past Surgical History:   Procedure Laterality Date    HERNIA REPAIR      1970's    OTHER SURGICAL HISTORY      ohs icrographic Surgery Face ; right upper lip        Past Family History  Family History   Problem Relation Age of Onset    Heart attack Father        Past Social history  Social History     Social History    Marital status: /Civil Union     Spouse name: N/A    Number of children: N/A    Years of education: N/A     Occupational History    Not on file       Social History Main Topics    Smoking status: Never Smoker    Smokeless tobacco: Never Used      Comment: smoked for 1 year at age 13    Alcohol use Yes      Comment: social    Drug use: No    Sexual activity: Not on file     Other Topics Concern    Not on file     Social History Narrative    Caffeine use       Current Medications  Current Outpatient Prescriptions   Medication Sig Dispense Refill    aspirin (ASPIRIN LOW DOSE) 81 MG tablet Take by mouth      Multiple Vitamin (DAILY VALUE MULTIVITAMIN) TABS Take by mouth      oxybutynin (DITROPAN) 5 mg tablet Take 2  5 mg tablets at bedtime 60 tablet 11    Saw Palmetto, Serenoa repens, ([employer] SAW PALMETTO) 450 MG CAPS Take by mouth      Sodium Fluoride (PREVIDENT 5000 BOOSTER) 1 1 % PSTE PreviDent 5000 Booster 1 1 % Dental Paste; 100 00; 0; 21-Apr-2012; 13-Dec-2010; Active      tamsulosin (FLOMAX) 0 4 mg TAKE 1 CAPSULE BY MOUTH EVERY DAY 90 capsule 1     No current facility-administered medications for this visit  Allergies  No Known Allergies    Past Medical History, Social History, Family History, medications and allergies were reviewed and updated as appropriate  Vitals  Vitals:    06/13/18 1112   BP: 110/78   BP Location: Left arm   Patient Position: Sitting   Weight: 71 7 kg (158 lb)   Height: 5' 10" (1 778 m)       Physical Exam  Skin: warm, dry, intact  Pulmonary: Non-labored breathing  Abdomen: Soft, non-tender, non-distended  Musculoskeletal: AROM with no joint deformity or tenderness    Neurology: Alert and oriented        Results  Lab Results   Component Value Date    PSA 1 7 08/18/2017    PSA 1 4 08/05/2016    PSA 1 2 07/29/2015     Lab Results   Component Value Date    GLUCOSE 81 08/05/2016    CALCIUM 8 6 08/18/2017     08/18/2017    K 4 2 08/18/2017    CO2 28 08/18/2017     08/18/2017    BUN 19 08/18/2017    CREATININE 0 66 08/18/2017     Lab Results   Component Value Date    WBC 4 82 08/18/2017    HGB 13 9 08/18/2017    HCT 42 1 08/18/2017    MCV 90 08/18/2017     08/18/2017

## 2018-07-30 DIAGNOSIS — R39.11 BENIGN PROSTATIC HYPERPLASIA WITH URINARY HESITANCY: ICD-10-CM

## 2018-07-30 DIAGNOSIS — N40.1 BENIGN PROSTATIC HYPERPLASIA WITH URINARY HESITANCY: ICD-10-CM

## 2018-08-01 RX ORDER — TAMSULOSIN HYDROCHLORIDE 0.4 MG/1
CAPSULE ORAL
Qty: 90 CAPSULE | Refills: 1 | Status: SHIPPED | OUTPATIENT
Start: 2018-08-01 | End: 2018-11-05 | Stop reason: SDUPTHER

## 2018-10-26 ENCOUNTER — TELEPHONE (OUTPATIENT)
Dept: FAMILY MEDICINE CLINIC | Facility: MEDICAL CENTER | Age: 78
End: 2018-10-26

## 2018-10-29 DIAGNOSIS — N40.0 BENIGN PROSTATIC HYPERPLASIA, UNSPECIFIED WHETHER LOWER URINARY TRACT SYMPTOMS PRESENT: Primary | ICD-10-CM

## 2018-10-29 DIAGNOSIS — Z13.220 SCREENING FOR LIPID DISORDERS: ICD-10-CM

## 2018-10-29 DIAGNOSIS — Z13.0 SCREENING FOR DISORDER OF BLOOD AND BLOOD-FORMING ORGANS: ICD-10-CM

## 2018-10-29 DIAGNOSIS — Z01.89 ENCOUNTER FOR ROUTINE LABORATORY TESTING: ICD-10-CM

## 2018-10-29 NOTE — TELEPHONE ENCOUNTER
Patient called again, would like blood work ordered prior to his appt on 11/05/2018 he would like to pick the script up tomorrow 10/30/2018 if possible  Would like a call once orders are ready      Routed to Dr Vivek Aguilar

## 2018-10-30 ENCOUNTER — LAB (OUTPATIENT)
Dept: LAB | Facility: MEDICAL CENTER | Age: 78
End: 2018-10-30
Payer: COMMERCIAL

## 2018-10-30 DIAGNOSIS — Z01.89 ENCOUNTER FOR ROUTINE LABORATORY TESTING: ICD-10-CM

## 2018-10-30 DIAGNOSIS — N40.0 BENIGN PROSTATIC HYPERPLASIA, UNSPECIFIED WHETHER LOWER URINARY TRACT SYMPTOMS PRESENT: ICD-10-CM

## 2018-10-30 DIAGNOSIS — Z13.220 SCREENING FOR LIPID DISORDERS: ICD-10-CM

## 2018-10-30 DIAGNOSIS — Z13.0 SCREENING FOR DISORDER OF BLOOD AND BLOOD-FORMING ORGANS: ICD-10-CM

## 2018-10-30 LAB
ALBUMIN SERPL BCP-MCNC: 3.7 G/DL (ref 3.5–5)
ALP SERPL-CCNC: 48 U/L (ref 46–116)
ALT SERPL W P-5'-P-CCNC: 25 U/L (ref 12–78)
ANION GAP SERPL CALCULATED.3IONS-SCNC: 5 MMOL/L (ref 4–13)
AST SERPL W P-5'-P-CCNC: 19 U/L (ref 5–45)
BASOPHILS # BLD AUTO: 0.08 THOUSANDS/ΜL (ref 0–0.1)
BASOPHILS NFR BLD AUTO: 2 % (ref 0–1)
BILIRUB SERPL-MCNC: 0.57 MG/DL (ref 0.2–1)
BUN SERPL-MCNC: 22 MG/DL (ref 5–25)
CALCIUM SERPL-MCNC: 8.6 MG/DL (ref 8.3–10.1)
CHLORIDE SERPL-SCNC: 103 MMOL/L (ref 100–108)
CHOLEST SERPL-MCNC: 177 MG/DL (ref 50–200)
CO2 SERPL-SCNC: 29 MMOL/L (ref 21–32)
CREAT SERPL-MCNC: 0.76 MG/DL (ref 0.6–1.3)
EOSINOPHIL # BLD AUTO: 0.27 THOUSAND/ΜL (ref 0–0.61)
EOSINOPHIL NFR BLD AUTO: 5 % (ref 0–6)
ERYTHROCYTE [DISTWIDTH] IN BLOOD BY AUTOMATED COUNT: 13.3 % (ref 11.6–15.1)
GFR SERPL CREATININE-BSD FRML MDRD: 88 ML/MIN/1.73SQ M
GLUCOSE P FAST SERPL-MCNC: 82 MG/DL (ref 65–99)
HCT VFR BLD AUTO: 47.6 % (ref 36.5–49.3)
HDLC SERPL-MCNC: 77 MG/DL (ref 40–60)
HGB BLD-MCNC: 15 G/DL (ref 12–17)
IMM GRANULOCYTES # BLD AUTO: 0.02 THOUSAND/UL (ref 0–0.2)
IMM GRANULOCYTES NFR BLD AUTO: 0 % (ref 0–2)
LDLC SERPL CALC-MCNC: 91 MG/DL (ref 0–100)
LYMPHOCYTES # BLD AUTO: 0.92 THOUSANDS/ΜL (ref 0.6–4.47)
LYMPHOCYTES NFR BLD AUTO: 18 % (ref 14–44)
MCH RBC QN AUTO: 29.3 PG (ref 26.8–34.3)
MCHC RBC AUTO-ENTMCNC: 31.5 G/DL (ref 31.4–37.4)
MCV RBC AUTO: 93 FL (ref 82–98)
MONOCYTES # BLD AUTO: 0.6 THOUSAND/ΜL (ref 0.17–1.22)
MONOCYTES NFR BLD AUTO: 12 % (ref 4–12)
NEUTROPHILS # BLD AUTO: 3.19 THOUSANDS/ΜL (ref 1.85–7.62)
NEUTS SEG NFR BLD AUTO: 63 % (ref 43–75)
NONHDLC SERPL-MCNC: 100 MG/DL
NRBC BLD AUTO-RTO: 0 /100 WBCS
PLATELET # BLD AUTO: 208 THOUSANDS/UL (ref 149–390)
PMV BLD AUTO: 10 FL (ref 8.9–12.7)
POTASSIUM SERPL-SCNC: 4.3 MMOL/L (ref 3.5–5.3)
PROT SERPL-MCNC: 7.4 G/DL (ref 6.4–8.2)
PSA SERPL-MCNC: 1.5 NG/ML (ref 0–4)
RBC # BLD AUTO: 5.12 MILLION/UL (ref 3.88–5.62)
SODIUM SERPL-SCNC: 137 MMOL/L (ref 136–145)
TRIGL SERPL-MCNC: 46 MG/DL
TSH SERPL DL<=0.05 MIU/L-ACNC: 5.02 UIU/ML (ref 0.36–3.74)
WBC # BLD AUTO: 5.08 THOUSAND/UL (ref 4.31–10.16)

## 2018-10-30 PROCEDURE — G0103 PSA SCREENING: HCPCS

## 2018-10-30 PROCEDURE — 85025 COMPLETE CBC W/AUTO DIFF WBC: CPT

## 2018-10-30 PROCEDURE — 84443 ASSAY THYROID STIM HORMONE: CPT

## 2018-10-30 PROCEDURE — 80061 LIPID PANEL: CPT

## 2018-10-30 PROCEDURE — 80053 COMPREHEN METABOLIC PANEL: CPT

## 2018-10-30 PROCEDURE — 36415 COLL VENOUS BLD VENIPUNCTURE: CPT

## 2018-11-05 ENCOUNTER — OFFICE VISIT (OUTPATIENT)
Dept: FAMILY MEDICINE CLINIC | Facility: MEDICAL CENTER | Age: 78
End: 2018-11-05
Payer: COMMERCIAL

## 2018-11-05 VITALS
HEIGHT: 70 IN | SYSTOLIC BLOOD PRESSURE: 112 MMHG | OXYGEN SATURATION: 98 % | DIASTOLIC BLOOD PRESSURE: 70 MMHG | HEART RATE: 82 BPM | BODY MASS INDEX: 22.82 KG/M2 | WEIGHT: 159.4 LBS

## 2018-11-05 DIAGNOSIS — R79.89 TSH ELEVATION: ICD-10-CM

## 2018-11-05 DIAGNOSIS — Z00.00 PREVENTATIVE HEALTH CARE: ICD-10-CM

## 2018-11-05 DIAGNOSIS — R39.11 BENIGN PROSTATIC HYPERPLASIA WITH URINARY HESITANCY: ICD-10-CM

## 2018-11-05 DIAGNOSIS — Z23 NEED FOR INFLUENZA VACCINATION: Primary | ICD-10-CM

## 2018-11-05 DIAGNOSIS — N40.1 BENIGN PROSTATIC HYPERPLASIA WITH URINARY HESITANCY: ICD-10-CM

## 2018-11-05 PROBLEM — E78.89 ELEVATED HDL: Status: ACTIVE | Noted: 2017-08-17

## 2018-11-05 PROCEDURE — 1160F RVW MEDS BY RX/DR IN RCRD: CPT | Performed by: FAMILY MEDICINE

## 2018-11-05 PROCEDURE — 3725F SCREEN DEPRESSION PERFORMED: CPT

## 2018-11-05 PROCEDURE — G0439 PPPS, SUBSEQ VISIT: HCPCS | Performed by: FAMILY MEDICINE

## 2018-11-05 PROCEDURE — 3008F BODY MASS INDEX DOCD: CPT | Performed by: FAMILY MEDICINE

## 2018-11-05 PROCEDURE — 1036F TOBACCO NON-USER: CPT | Performed by: FAMILY MEDICINE

## 2018-11-05 PROCEDURE — 1101F PT FALLS ASSESS-DOCD LE1/YR: CPT | Performed by: FAMILY MEDICINE

## 2018-11-05 PROCEDURE — G0008 ADMIN INFLUENZA VIRUS VAC: HCPCS

## 2018-11-05 PROCEDURE — 1170F FXNL STATUS ASSESSED: CPT

## 2018-11-05 PROCEDURE — 1160F RVW MEDS BY RX/DR IN RCRD: CPT

## 2018-11-05 PROCEDURE — 90662 IIV NO PRSV INCREASED AG IM: CPT

## 2018-11-05 PROCEDURE — 1125F AMNT PAIN NOTED PAIN PRSNT: CPT

## 2018-11-05 PROCEDURE — 4040F PNEUMOC VAC/ADMIN/RCVD: CPT

## 2018-11-05 PROCEDURE — 99214 OFFICE O/P EST MOD 30 MIN: CPT | Performed by: FAMILY MEDICINE

## 2018-11-05 RX ORDER — TAMSULOSIN HYDROCHLORIDE 0.4 MG/1
0.4 CAPSULE ORAL DAILY
Qty: 90 CAPSULE | Refills: 2 | Status: SHIPPED | OUTPATIENT
Start: 2018-11-05 | End: 2019-07-15 | Stop reason: SDUPTHER

## 2018-11-05 RX ORDER — LEVOTHYROXINE SODIUM 0.05 MG/1
50 TABLET ORAL DAILY
Qty: 30 TABLET | Refills: 3 | Status: SHIPPED | OUTPATIENT
Start: 2018-11-05 | End: 2019-01-07 | Stop reason: SDUPTHER

## 2018-11-05 NOTE — PROGRESS NOTES
Patient lives at home independently  Patient has no concerns about the status of his health at this time  Patient lives with spouse  Has an active social life and is not socially isolated  There have been no behavioral problems  Patient is completely independent  Able to dress, bathe, ambulate, feed self etc  Patient is able to drive an automobile  There are no limitations with the patient shopping, housekeeping, yard maintenance etc     Patient tries to eat a balanced diet incorporating both the of vegetables and lean meats  Patient gets exercise by trying to walk most days  20-30 minutes  There has been no unexplained weight loss or weight gain  Patient does have advanced directives  Patient's home is well lit and  uncluttered  Night lights are used at night  Grab bars are available in the bathroom  Patient's other healthcare providers, if any, are listed in the appropriate section of this chart  Patient's vital signs and BMI were reviewed and available on this chart  Screening for depression, urinary incontinence and fall risk were performed today and those results are available in the screening section of this chart  The patient is completely oriented alert and conversant  Appropriate thought and affect  The patient's "timed up and go test" is normal (easily under 12 seconds)    Patient is up to date with colonoscopy, done now at his age  PSA normal and done regularly  Patient up to date with flu and pneumonia shot  Will be getting Shingrex

## 2018-11-05 NOTE — ASSESSMENT & PLAN NOTE
Patient had been taking Ditropan for spasmodic bladder  He found that it did not help and discontinued it  He still takes tamsulosin  He does have some minor problems with a little bit of urinary incontinence if he does not get to the bathroom soon enough    Continue tamsulosin, continue routine follow-up

## 2018-11-05 NOTE — ASSESSMENT & PLAN NOTE
Patient has had very minor elevations in his TSH  He would like to try some thyroid replacement  He tells me that he gets cold very easily even on warm days    Will begin levothyroxine 50 mcg  Will check a TSH in about four weeks

## 2018-11-05 NOTE — PROGRESS NOTES
Assessment/Plan:    Benign prostatic hyperplasia (BPH) with urinary urge incontinence  Patient had been taking Ditropan for spasmodic bladder  He found that it did not help and discontinued it  He still takes tamsulosin  He does have some minor problems with a little bit of urinary incontinence if he does not get to the bathroom soon enough    Continue tamsulosin, continue routine follow-up  TSH elevation  Patient has had very minor elevations in his TSH  He would like to try some thyroid replacement  He tells me that he gets cold very easily even on warm days    Will begin levothyroxine 50 mcg  Will check a TSH in about four weeks  Diagnoses and all orders for this visit:    Need for influenza vaccination  -     influenza vaccine, 3811-0101, high-dose, PF 0 5 mL, for patients 65 yr+ (FLUZONE HIGH-DOSE)    Benign prostatic hyperplasia with urinary hesitancy  -     tamsulosin (FLOMAX) 0 4 mg; Take 1 capsule (0 4 mg total) by mouth daily    Preventative health care    TSH elevation  -     levothyroxine 50 mcg tablet; Take 1 tablet (50 mcg total) by mouth daily  -     TSH, 3rd generation with Free T4 reflex; Future          Subjective:      Patient ID: Ronda Byers is a 69 y o  male  Here for routine follow up of medical problems  Please see assesment and plan for discussion  The following portions of the patient's history were reviewed and updated as appropriate: allergies, current medications, past family history, past medical history, past social history, past surgical history and problem list     Review of Systems   Constitutional: Negative for activity change, fatigue and fever  HENT: Negative for congestion, ear discharge, ear pain, postnasal drip, rhinorrhea, sinus pain, sneezing and sore throat  Eyes: Negative for photophobia, pain, discharge and redness  Respiratory: Negative for apnea, cough, shortness of breath and wheezing      Cardiovascular: Negative for chest pain and palpitations  Gastrointestinal: Negative for abdominal pain, blood in stool, constipation, diarrhea, nausea and vomiting  Endocrine: Negative for polydipsia, polyphagia and polyuria  Genitourinary: Positive for urgency  Negative for decreased urine volume, difficulty urinating, discharge, dysuria and penile pain  Musculoskeletal: Negative for arthralgias, gait problem, joint swelling and neck pain  Skin: Negative for color change and rash  Neurological: Negative for dizziness, tremors, seizures, weakness and headaches  Psychiatric/Behavioral: Negative for agitation and sleep disturbance  The patient is not nervous/anxious  Objective:      /70 (BP Location: Left arm, Patient Position: Sitting, Cuff Size: Adult)   Pulse 82   Ht 5' 10" (1 778 m)   Wt 72 3 kg (159 lb 6 4 oz)   SpO2 98%   BMI 22 87 kg/m²          Physical Exam   Constitutional: He is oriented to person, place, and time  Vital signs are normal  He appears well-developed and well-nourished  He is cooperative  HENT:   Head: Normocephalic  Right Ear: External ear normal    Left Ear: External ear normal    Nose: Nose normal    Mouth/Throat: Oropharynx is clear and moist    Eyes: Pupils are equal, round, and reactive to light  Conjunctivae, EOM and lids are normal    Neck: Normal range of motion  Neck supple  Carotid bruit is not present  No thyromegaly present  Cardiovascular: Normal rate, regular rhythm, S1 normal, S2 normal, normal heart sounds, intact distal pulses and normal pulses  No murmur heard  Pulmonary/Chest: Effort normal and breath sounds normal  No respiratory distress  He has no wheezes  He has no rales  Abdominal: Soft  Normal appearance and bowel sounds are normal  He exhibits no mass  There is no hepatosplenomegaly  There is no tenderness  Musculoskeletal: Normal range of motion  Lymphadenopathy:     He has no cervical adenopathy     Neurological: He is alert and oriented to person, place, and time  He has normal strength and normal reflexes  No cranial nerve deficit or sensory deficit  Skin: Skin is warm, dry and intact  No rash noted  No pallor  Psychiatric: He has a normal mood and affect  His behavior is normal  Judgment and thought content normal  Cognition and memory are normal    Nursing note and vitals reviewed

## 2018-11-05 NOTE — PROGRESS NOTES
Assessment and Plan:    Problem List Items Addressed This Visit     None        Health Maintenance Due   Topic Date Due    Depression Screening PHQ  1940    SLP PLAN OF CARE  1940    Fall Risk  09/05/2006    INFLUENZA VACCINE  07/01/2018         HPI:  Devin Graves is a 69 y o  male here for his Subsequent Wellness Visit  Patient Active Problem List   Diagnosis    Benign prostatic hyperplasia (BPH) with urinary urge incontinence     Past Medical History:   Diagnosis Date    Basal cell carcinoma      Past Surgical History:   Procedure Laterality Date    HERNIA REPAIR      1970's    OTHER SURGICAL HISTORY      ohs icrographic Surgery Face ; right upper lip     Family History   Problem Relation Age of Onset    Heart attack Father      History   Smoking Status    Never Smoker   Smokeless Tobacco    Never Used     Comment: smoked for 1 year at age 13     History   Alcohol Use    Yes     Comment: social      History   Drug Use No       Current Outpatient Prescriptions   Medication Sig Dispense Refill    aspirin (ASPIRIN LOW DOSE) 81 MG tablet Take by mouth      Multiple Vitamin (DAILY VALUE MULTIVITAMIN) TABS Take by mouth      oxybutynin (DITROPAN) 5 mg tablet Take 2  5 mg tablets at bedtime 60 tablet 11    Saw Palmetto, Serenoa repens, ([employer] SAW PALMETTO) 450 MG CAPS Take by mouth      Sodium Fluoride (PREVIDENT 5000 BOOSTER) 1 1 % PSTE PreviDent 5000 Booster 1 1 % Dental Paste; 100 00; 0; 21-Apr-2012; 13-Dec-2010; Active      tamsulosin (FLOMAX) 0 4 mg TAKE 1 CAPSULE BY MOUTH EVERY DAY 90 capsule 1     No current facility-administered medications for this visit        No Known Allergies  Immunization History   Administered Date(s) Administered    Influenza Split High Dose Preservative Free IM 09/30/2015, 10/20/2016, 10/24/2017    Pneumococcal Conjugate 13-Valent 08/28/2017    Pneumococcal Polysaccharide PPV23 12/15/2008    Tdap 06/13/2012       Patient Care Team:  Sonja Estrada MD as PCP - General  MD Erica Bustillos MD Kerby Hale, MD    Medicare Screening Tests and Risk Assessments:  Guadalupe Jason is here for his Subsequent Wellness visit  Health Risk Assessment:  Patient rates overall health as good  Patient feels that their physical health rating is Same  Eyesight was rated as Same  Hearing was rated as Same  Patient feels that their emotional and mental health rating is Same  Pain experienced by patient in the last 7 days has been None  Patient states that he has experienced no weight loss or gain in last 6 months  Emotional/Mental Health:  Patient has been feeling nervous/anxious  PHQ-9 Depression Screening:    Frequency of the following problems over the past two weeks:      1  Little interest or pleasure in doing things: 0 - not at all      2  Feeling down, depressed, or hopeless: 0 - not at all  PHQ-2 Score: 0          Broken Bones/Falls: Fall Risk Assessment:    In the past year, patient has experienced: No history of falling in past year          Bladder/Bowel:  Patient has leaked urine accidently in the last six months  Patient reports loss of bowel control  (Additional Comments: See urology)    Immunizations:  Patient has had a flu vaccination within the last year  Patient has received a pneumonia shot  Patient has not received a shingles shot  Patient has received tetanus/diphtheria shot  Home Safety:  Patient does not have trouble with stairs inside or outside of their home  Patient currently reports that there are no safety hazards present in home, working smoke alarms, working carbon monoxide detectors  Preventative Screenings:   prostate cancer screen performed, cholesterol screen completed, glaucoma eye exam completed,     Nutrition:  Current diet: Regular with servings of the following:    Medications:  Patient is currently taking over-the-counter supplements  Patient is able to manage medications      Lifestyle Choices:  Patient reports no tobacco use  Patient has smoked or used tobacco in the past   Patient has stopped his tobacco use  Tobacco use quit date: many years ago  Patient reports no alcohol use  Patient drives a vehicle  Patient wears seat belt  Activities of Daily Living:  Can get out of bed by his or her self, able to dress self, able to make own meals, able to do own shopping, able to bathe self, can do own laundry/housekeeping, can manage own money, pay bills and track expenses    Previous Hospitalizations:  No hospitalization or ED visit in past 12 months        Advanced Directives:  Patient has decided on a power of   Patient has spoken to designated power of   Patient has completed advanced directive

## 2018-11-21 ENCOUNTER — LAB (OUTPATIENT)
Dept: LAB | Facility: CLINIC | Age: 78
End: 2018-11-21
Payer: COMMERCIAL

## 2018-11-21 DIAGNOSIS — R79.89 TSH ELEVATION: ICD-10-CM

## 2018-11-21 LAB — TSH SERPL DL<=0.05 MIU/L-ACNC: 3.62 UIU/ML (ref 0.36–3.74)

## 2018-11-21 PROCEDURE — 36415 COLL VENOUS BLD VENIPUNCTURE: CPT

## 2018-11-21 PROCEDURE — 84443 ASSAY THYROID STIM HORMONE: CPT

## 2019-01-07 ENCOUNTER — OFFICE VISIT (OUTPATIENT)
Dept: FAMILY MEDICINE CLINIC | Facility: MEDICAL CENTER | Age: 79
End: 2019-01-07
Payer: COMMERCIAL

## 2019-01-07 VITALS
RESPIRATION RATE: 16 BRPM | HEART RATE: 68 BPM | WEIGHT: 161 LBS | SYSTOLIC BLOOD PRESSURE: 112 MMHG | BODY MASS INDEX: 23.1 KG/M2 | DIASTOLIC BLOOD PRESSURE: 62 MMHG

## 2019-01-07 DIAGNOSIS — R79.89 TSH ELEVATION: ICD-10-CM

## 2019-01-07 PROCEDURE — 1160F RVW MEDS BY RX/DR IN RCRD: CPT | Performed by: FAMILY MEDICINE

## 2019-01-07 PROCEDURE — 1036F TOBACCO NON-USER: CPT | Performed by: FAMILY MEDICINE

## 2019-01-07 PROCEDURE — 99213 OFFICE O/P EST LOW 20 MIN: CPT | Performed by: FAMILY MEDICINE

## 2019-01-07 RX ORDER — LEVOTHYROXINE SODIUM 0.07 MG/1
75 TABLET ORAL DAILY
Qty: 30 TABLET | Refills: 2 | Status: SHIPPED | OUTPATIENT
Start: 2019-01-07 | End: 2020-11-19

## 2019-01-07 NOTE — PROGRESS NOTES
Patient had been complaining of getting cold very easily  We had been watching his TSH levels which had been high  No particular trend  There had been no other constitutional complaints    We started treatment with levothyroxine 50 mg  He has not seen much improvement with his being cold all the time  His TSH was 3 6 just under the normal top value  /62   Pulse 68 Comment: Regular , occasional early beat  Resp 16   Wt 73 kg (161 lb)   BMI 23 10 kg/m²     HEENT examination is within normal limits no acute findings  Neck was supple  Chest clear  Cardiac exam revealed a regular rate and rhythm without murmur rub or gallop  Abdomen is soft and nontender  Thyroid examination was normal    Will continue to increase the levothyroxine  Will go to 75 mcg  Recheck TSH in a month and then recheck back here in about six weeks

## 2019-02-07 ENCOUNTER — LAB (OUTPATIENT)
Dept: LAB | Facility: CLINIC | Age: 79
End: 2019-02-07
Payer: COMMERCIAL

## 2019-02-07 DIAGNOSIS — R79.89 TSH ELEVATION: ICD-10-CM

## 2019-02-07 LAB — TSH SERPL DL<=0.05 MIU/L-ACNC: 2.82 UIU/ML (ref 0.36–3.74)

## 2019-02-07 PROCEDURE — 36415 COLL VENOUS BLD VENIPUNCTURE: CPT

## 2019-02-07 PROCEDURE — 84443 ASSAY THYROID STIM HORMONE: CPT

## 2019-02-11 ENCOUNTER — TELEPHONE (OUTPATIENT)
Dept: FAMILY MEDICINE CLINIC | Facility: MEDICAL CENTER | Age: 79
End: 2019-02-11

## 2019-02-11 NOTE — TELEPHONE ENCOUNTER
Cancelled his appt for tomorrow  He has decided not to take the thyroid med anymore  It has no affect on his cold-feeling at night and he'd rather not be on a medicine

## 2019-07-10 ENCOUNTER — OFFICE VISIT (OUTPATIENT)
Dept: UROLOGY | Facility: AMBULATORY SURGERY CENTER | Age: 79
End: 2019-07-10
Payer: COMMERCIAL

## 2019-07-10 VITALS
DIASTOLIC BLOOD PRESSURE: 70 MMHG | BODY MASS INDEX: 21.76 KG/M2 | HEIGHT: 70 IN | HEART RATE: 68 BPM | SYSTOLIC BLOOD PRESSURE: 130 MMHG | WEIGHT: 152 LBS

## 2019-07-10 DIAGNOSIS — N40.1 BPH WITH URINARY OBSTRUCTION: Primary | ICD-10-CM

## 2019-07-10 DIAGNOSIS — N13.8 BPH WITH URINARY OBSTRUCTION: Primary | ICD-10-CM

## 2019-07-10 LAB — POST-VOID RESIDUAL VOLUME, ML POC: 43 ML

## 2019-07-10 PROCEDURE — 99213 OFFICE O/P EST LOW 20 MIN: CPT | Performed by: NURSE PRACTITIONER

## 2019-07-10 PROCEDURE — 51798 US URINE CAPACITY MEASURE: CPT | Performed by: NURSE PRACTITIONER

## 2019-07-10 NOTE — LETTER
July 10, 2019     Rey Diaz MD  [facility] [address]    Patient: Alise Estrada   YOB: 1940   Date of Visit: 7/10/2019       Dear Dr Tuan Ireland: Thank you for referring Atiya Bui to me for evaluation  Below are my notes for this consultation  If you have questions, please do not hesitate to call me  I look forward to following your patient along with you  Sincerely,        KEYONNA Killian        CC: No Recipients  KEYONNA Killian  7/10/2019  3:53 PM  Sign at close encounter  7/10/2019    Chief Complaint   Patient presents with    Benign Prostatic Hypertrophy     Assessment and Plan    69 y o  male managed by Dr Ada Enamorado    1  Benign prostatic hyperplasia  · Tamsulosin 0 4 mg p o  Daily  · Bladder scan PVR in office 43 ml    2  Urinary urgency  · Oxybutynin 5 mg p o  Daily was stopped secondary to being ineffective  · Maintain adequate hydration to avoid concentrated urine  · Timed voiding    History of Present Illness  Alise Estrada is a 69 y o  male here for follow up evaluation of  benign prostatic hyperplasia with urinary urgency  Patient reports to the office today stating that his urinary symptoms have significantly resolved with the initiation of tamsulosin 0 4 mg p o  Daily  Patient reports discontinuing oxybutynin secondary to it not being effective  Patient currently denies dysuria, hematuria, urinary frequency and urgency  He reports getting up between 1 and 2 times at night to urinate  He reports his urinary stream to be fair  He currently is satisfied with his urinary health  AUA score 18      Review of Systems   Constitutional: Negative for chills and fever  Respiratory: Negative for cough and shortness of breath  Cardiovascular: Negative for chest pain  Gastrointestinal: Negative for abdominal distention, abdominal pain, blood in stool, nausea and vomiting     Genitourinary: Negative for difficulty urinating, dysuria, enuresis, flank pain, frequency, hematuria and urgency  Musculoskeletal: Negative for back pain  Skin: Negative for rash  Neurological: Negative for dizziness         Past Medical History  Past Medical History:   Diagnosis Date    Basal cell carcinoma     Disease of thyroid gland        Past Social History  Past Surgical History:   Procedure Laterality Date    HERNIA REPAIR      1970's    OTHER SURGICAL HISTORY      ohs icrographic Surgery Face ; right upper lip     Social History     Tobacco Use   Smoking Status Never Smoker   Smokeless Tobacco Never Used   Tobacco Comment    smoked for 1 year at age 13       Past Family History  Family History   Problem Relation Age of Onset    Heart attack Father     No Known Problems Sister     Coronary artery disease Brother        Past Social history  Social History     Socioeconomic History    Marital status: /Civil Union     Spouse name: Not on file    Number of children: Not on file    Years of education: Not on file    Highest education level: Not on file   Occupational History    Not on file   Social Needs    Financial resource strain: Not on file    Food insecurity:     Worry: Not on file     Inability: Not on file    Transportation needs:     Medical: Not on file     Non-medical: Not on file   Tobacco Use    Smoking status: Never Smoker    Smokeless tobacco: Never Used    Tobacco comment: smoked for 1 year at age 13   Substance and Sexual Activity    Alcohol use: Yes     Comment: social    Drug use: No    Sexual activity: Not on file   Lifestyle    Physical activity:     Days per week: Not on file     Minutes per session: Not on file    Stress: Not on file   Relationships    Social connections:     Talks on phone: Not on file     Gets together: Not on file     Attends Confucianist service: Not on file     Active member of club or organization: Not on file     Attends meetings of clubs or organizations: Not on file Relationship status: Not on file    Intimate partner violence:     Fear of current or ex partner: Not on file     Emotionally abused: Not on file     Physically abused: Not on file     Forced sexual activity: Not on file   Other Topics Concern    Not on file   Social History Narrative    Caffeine use       Current Medications  Current Outpatient Medications   Medication Sig Dispense Refill    aspirin (ASPIRIN LOW DOSE) 81 MG tablet Take by mouth      levothyroxine 75 mcg tablet Take 1 tablet (75 mcg total) by mouth daily 30 tablet 2    Multiple Vitamin (DAILY VALUE MULTIVITAMIN) TABS Take by mouth      Saw Palmetto, Serenoa repens, ([employer] SAW PALMETTO) 450 MG CAPS Take by mouth      Sodium Fluoride (PREVIDENT 5000 BOOSTER) 1 1 % PSTE PreviDent 5000 Booster 1 1 % Dental Paste; 100 00; 0; 21-Apr-2012; 13-Dec-2010; Active      tamsulosin (FLOMAX) 0 4 mg Take 1 capsule (0 4 mg total) by mouth daily 90 capsule 2     No current facility-administered medications for this visit  Allergies  No Known Allergies      The following portions of the patient's history were reviewed and updated as appropriate: allergies, current medications, past medical history, past social history, past surgical history and problem list       Vitals  Vitals:    07/10/19 0830   BP: 130/70   Pulse: 68   Weight: 68 9 kg (152 lb)   Height: 5' 10" (1 778 m)     Physical Exam  Physical Exam   Constitutional: He is oriented to person, place, and time  He appears well-developed and well-nourished  HENT:   Head: Atraumatic  Eyes: EOM are normal    Neck: Normal range of motion  Neck supple  Cardiovascular: Normal rate, regular rhythm and normal heart sounds  No murmur heard  Pulmonary/Chest: Effort normal and breath sounds normal  No respiratory distress  Abdominal: Soft  Bowel sounds are normal  There is no tenderness  Musculoskeletal: Normal range of motion  Neurological: He is alert and oriented to person, place, and time  Skin: Skin is warm and dry  Psychiatric: He has a normal mood and affect  Vitals reviewed  Results  No results found for this or any previous visit (from the past 1 hour(s))    Lab Results   Component Value Date    PSA 1 5 10/30/2018    PSA 1 7 08/18/2017    PSA 1 4 08/05/2016     Lab Results   Component Value Date    GLUCOSE 88 07/29/2015    CALCIUM 8 6 10/30/2018     07/29/2015    K 4 3 10/30/2018    CO2 29 10/30/2018     10/30/2018    BUN 22 10/30/2018    CREATININE 0 76 10/30/2018     Lab Results   Component Value Date    WBC 5 08 10/30/2018    HGB 15 0 10/30/2018    HCT 47 6 10/30/2018    MCV 93 10/30/2018     10/30/2018     Orders  Orders Placed This Encounter   Procedures    POCT Measure PVR     KEYONNA Gilbert

## 2019-07-10 NOTE — PROGRESS NOTES
7/10/2019    Chief Complaint   Patient presents with    Benign Prostatic Hypertrophy     Assessment and Plan    69 y o  male managed by Dr Ferreira Prost    1  Benign prostatic hyperplasia  · Tamsulosin 0 4 mg p o  Daily  · Bladder scan PVR in office 43 ml    2  Urinary urgency  · Oxybutynin 5 mg p o  Daily was stopped secondary to being ineffective  · Maintain adequate hydration to avoid concentrated urine  · Timed voiding    History of Present Illness  Dunia Petersen is a 69 y o  male here for follow up evaluation of  benign prostatic hyperplasia with urinary urgency  Patient reports to the office today stating that his urinary symptoms have significantly resolved with the initiation of tamsulosin 0 4 mg p o  Daily  Patient reports discontinuing oxybutynin secondary to it not being effective  Patient currently denies dysuria, hematuria, urinary frequency and urgency  He reports getting up between 1 and 2 times at night to urinate  He reports his urinary stream to be fair  He currently is satisfied with his urinary health  AUA score 18      Review of Systems   Constitutional: Negative for chills and fever  Respiratory: Negative for cough and shortness of breath  Cardiovascular: Negative for chest pain  Gastrointestinal: Negative for abdominal distention, abdominal pain, blood in stool, nausea and vomiting  Genitourinary: Negative for difficulty urinating, dysuria, enuresis, flank pain, frequency, hematuria and urgency  Musculoskeletal: Negative for back pain  Skin: Negative for rash  Neurological: Negative for dizziness         Past Medical History  Past Medical History:   Diagnosis Date    Basal cell carcinoma     Disease of thyroid gland        Past Social History  Past Surgical History:   Procedure Laterality Date    HERNIA REPAIR      1970's    OTHER SURGICAL HISTORY      ohs icrographic Surgery Face ; right upper lip     Social History     Tobacco Use   Smoking Status Never Smoker Smokeless Tobacco Never Used   Tobacco Comment    smoked for 1 year at age 13       Past Family History  Family History   Problem Relation Age of Onset    Heart attack Father     No Known Problems Sister     Coronary artery disease Brother        Past Social history  Social History     Socioeconomic History    Marital status: /Civil Union     Spouse name: Not on file    Number of children: Not on file    Years of education: Not on file    Highest education level: Not on file   Occupational History    Not on file   Social Needs    Financial resource strain: Not on file    Food insecurity:     Worry: Not on file     Inability: Not on file    Transportation needs:     Medical: Not on file     Non-medical: Not on file   Tobacco Use    Smoking status: Never Smoker    Smokeless tobacco: Never Used    Tobacco comment: smoked for 1 year at age 13   Substance and Sexual Activity    Alcohol use: Yes     Comment: social    Drug use: No    Sexual activity: Not on file   Lifestyle    Physical activity:     Days per week: Not on file     Minutes per session: Not on file    Stress: Not on file   Relationships    Social connections:     Talks on phone: Not on file     Gets together: Not on file     Attends Orthodox service: Not on file     Active member of club or organization: Not on file     Attends meetings of clubs or organizations: Not on file     Relationship status: Not on file    Intimate partner violence:     Fear of current or ex partner: Not on file     Emotionally abused: Not on file     Physically abused: Not on file     Forced sexual activity: Not on file   Other Topics Concern    Not on file   Social History Narrative    Caffeine use       Current Medications  Current Outpatient Medications   Medication Sig Dispense Refill    aspirin (ASPIRIN LOW DOSE) 81 MG tablet Take by mouth      levothyroxine 75 mcg tablet Take 1 tablet (75 mcg total) by mouth daily 30 tablet 2    Multiple Vitamin (DAILY VALUE MULTIVITAMIN) TABS Take by mouth      Saw Palmetto, Serenoa repens, ([employer] SAW PALMETTO) 450 MG CAPS Take by mouth      Sodium Fluoride (PREVIDENT 5000 BOOSTER) 1 1 % PSTE PreviDent 5000 Booster 1 1 % Dental Paste; 100 00; 0; 21-Apr-2012; 13-Dec-2010; Active      tamsulosin (FLOMAX) 0 4 mg Take 1 capsule (0 4 mg total) by mouth daily 90 capsule 2     No current facility-administered medications for this visit  Allergies  No Known Allergies      The following portions of the patient's history were reviewed and updated as appropriate: allergies, current medications, past medical history, past social history, past surgical history and problem list       Vitals  Vitals:    07/10/19 0830   BP: 130/70   Pulse: 68   Weight: 68 9 kg (152 lb)   Height: 5' 10" (1 778 m)     Physical Exam  Physical Exam   Constitutional: He is oriented to person, place, and time  He appears well-developed and well-nourished  HENT:   Head: Atraumatic  Eyes: EOM are normal    Neck: Normal range of motion  Neck supple  Cardiovascular: Normal rate, regular rhythm and normal heart sounds  No murmur heard  Pulmonary/Chest: Effort normal and breath sounds normal  No respiratory distress  Abdominal: Soft  Bowel sounds are normal  There is no tenderness  Musculoskeletal: Normal range of motion  Neurological: He is alert and oriented to person, place, and time  Skin: Skin is warm and dry  Psychiatric: He has a normal mood and affect  Vitals reviewed  Results  No results found for this or any previous visit (from the past 1 hour(s))    Lab Results   Component Value Date    PSA 1 5 10/30/2018    PSA 1 7 08/18/2017    PSA 1 4 08/05/2016     Lab Results   Component Value Date    GLUCOSE 88 07/29/2015    CALCIUM 8 6 10/30/2018     07/29/2015    K 4 3 10/30/2018    CO2 29 10/30/2018     10/30/2018    BUN 22 10/30/2018    CREATININE 0 76 10/30/2018     Lab Results   Component Value Date    WBC 5 08 10/30/2018    HGB 15 0 10/30/2018    HCT 47 6 10/30/2018    MCV 93 10/30/2018     10/30/2018     Orders  Orders Placed This Encounter   Procedures    POCT Measure PVR     KEYONNA Walker

## 2019-07-15 DIAGNOSIS — N40.1 BENIGN PROSTATIC HYPERPLASIA WITH URINARY HESITANCY: ICD-10-CM

## 2019-07-15 DIAGNOSIS — R39.11 BENIGN PROSTATIC HYPERPLASIA WITH URINARY HESITANCY: ICD-10-CM

## 2019-07-17 RX ORDER — TAMSULOSIN HYDROCHLORIDE 0.4 MG/1
CAPSULE ORAL
Qty: 90 CAPSULE | Refills: 2 | Status: SHIPPED | OUTPATIENT
Start: 2019-07-17 | End: 2019-11-11

## 2019-11-11 ENCOUNTER — OFFICE VISIT (OUTPATIENT)
Dept: FAMILY MEDICINE CLINIC | Facility: MEDICAL CENTER | Age: 79
End: 2019-11-11
Payer: COMMERCIAL

## 2019-11-11 ENCOUNTER — APPOINTMENT (OUTPATIENT)
Dept: LAB | Facility: MEDICAL CENTER | Age: 79
End: 2019-11-11
Payer: COMMERCIAL

## 2019-11-11 VITALS
WEIGHT: 153 LBS | BODY MASS INDEX: 21.9 KG/M2 | DIASTOLIC BLOOD PRESSURE: 82 MMHG | HEIGHT: 70 IN | SYSTOLIC BLOOD PRESSURE: 138 MMHG | RESPIRATION RATE: 16 BRPM | HEART RATE: 64 BPM

## 2019-11-11 DIAGNOSIS — J30.1 SEASONAL ALLERGIC RHINITIS DUE TO POLLEN: ICD-10-CM

## 2019-11-11 DIAGNOSIS — E78.89 ELEVATED HDL: ICD-10-CM

## 2019-11-11 DIAGNOSIS — Z13.1 SCREENING FOR DIABETES MELLITUS: ICD-10-CM

## 2019-11-11 DIAGNOSIS — Z23 IMMUNIZATION DUE: Primary | ICD-10-CM

## 2019-11-11 DIAGNOSIS — R79.89 TSH ELEVATION: ICD-10-CM

## 2019-11-11 DIAGNOSIS — Z13.0 SCREENING FOR DISORDER OF BLOOD AND BLOOD-FORMING ORGANS: ICD-10-CM

## 2019-11-11 DIAGNOSIS — N40.1 BENIGN PROSTATIC HYPERPLASIA (BPH) WITH URINARY URGE INCONTINENCE: ICD-10-CM

## 2019-11-11 DIAGNOSIS — N39.41 BENIGN PROSTATIC HYPERPLASIA (BPH) WITH URINARY URGE INCONTINENCE: ICD-10-CM

## 2019-11-11 LAB
ALBUMIN SERPL BCP-MCNC: 3.6 G/DL (ref 3.5–5)
ALP SERPL-CCNC: 47 U/L (ref 46–116)
ALT SERPL W P-5'-P-CCNC: 23 U/L (ref 12–78)
ANION GAP SERPL CALCULATED.3IONS-SCNC: 5 MMOL/L (ref 4–13)
AST SERPL W P-5'-P-CCNC: 18 U/L (ref 5–45)
BASOPHILS # BLD AUTO: 0.09 THOUSANDS/ΜL (ref 0–0.1)
BASOPHILS NFR BLD AUTO: 2 % (ref 0–1)
BILIRUB SERPL-MCNC: 0.61 MG/DL (ref 0.2–1)
BUN SERPL-MCNC: 25 MG/DL (ref 5–25)
CALCIUM SERPL-MCNC: 8.9 MG/DL (ref 8.3–10.1)
CHLORIDE SERPL-SCNC: 107 MMOL/L (ref 100–108)
CHOLEST SERPL-MCNC: 179 MG/DL (ref 50–200)
CO2 SERPL-SCNC: 27 MMOL/L (ref 21–32)
CREAT SERPL-MCNC: 0.72 MG/DL (ref 0.6–1.3)
EOSINOPHIL # BLD AUTO: 0.27 THOUSAND/ΜL (ref 0–0.61)
EOSINOPHIL NFR BLD AUTO: 5 % (ref 0–6)
ERYTHROCYTE [DISTWIDTH] IN BLOOD BY AUTOMATED COUNT: 13.7 % (ref 11.6–15.1)
GFR SERPL CREATININE-BSD FRML MDRD: 89 ML/MIN/1.73SQ M
GLUCOSE P FAST SERPL-MCNC: 81 MG/DL (ref 65–99)
HCT VFR BLD AUTO: 44.7 % (ref 36.5–49.3)
HDLC SERPL-MCNC: 88 MG/DL
HGB BLD-MCNC: 14.2 G/DL (ref 12–17)
IMM GRANULOCYTES # BLD AUTO: 0.04 THOUSAND/UL (ref 0–0.2)
IMM GRANULOCYTES NFR BLD AUTO: 1 % (ref 0–2)
LDLC SERPL CALC-MCNC: 82 MG/DL (ref 0–100)
LYMPHOCYTES # BLD AUTO: 0.84 THOUSANDS/ΜL (ref 0.6–4.47)
LYMPHOCYTES NFR BLD AUTO: 14 % (ref 14–44)
MCH RBC QN AUTO: 29.4 PG (ref 26.8–34.3)
MCHC RBC AUTO-ENTMCNC: 31.8 G/DL (ref 31.4–37.4)
MCV RBC AUTO: 93 FL (ref 82–98)
MONOCYTES # BLD AUTO: 0.76 THOUSAND/ΜL (ref 0.17–1.22)
MONOCYTES NFR BLD AUTO: 13 % (ref 4–12)
NEUTROPHILS # BLD AUTO: 4.06 THOUSANDS/ΜL (ref 1.85–7.62)
NEUTS SEG NFR BLD AUTO: 65 % (ref 43–75)
NRBC BLD AUTO-RTO: 0 /100 WBCS
PLATELET # BLD AUTO: 229 THOUSANDS/UL (ref 149–390)
PMV BLD AUTO: 9.8 FL (ref 8.9–12.7)
POTASSIUM SERPL-SCNC: 4.4 MMOL/L (ref 3.5–5.3)
PROT SERPL-MCNC: 6.9 G/DL (ref 6.4–8.2)
RBC # BLD AUTO: 4.83 MILLION/UL (ref 3.88–5.62)
SODIUM SERPL-SCNC: 139 MMOL/L (ref 136–145)
T4 FREE SERPL-MCNC: 0.86 NG/DL (ref 0.76–1.46)
TRIGL SERPL-MCNC: 44 MG/DL
TSH SERPL DL<=0.05 MIU/L-ACNC: 3.88 UIU/ML (ref 0.36–3.74)
WBC # BLD AUTO: 6.06 THOUSAND/UL (ref 4.31–10.16)

## 2019-11-11 PROCEDURE — G0008 ADMIN INFLUENZA VIRUS VAC: HCPCS

## 2019-11-11 PROCEDURE — 85025 COMPLETE CBC W/AUTO DIFF WBC: CPT

## 2019-11-11 PROCEDURE — 90662 IIV NO PRSV INCREASED AG IM: CPT

## 2019-11-11 PROCEDURE — 84443 ASSAY THYROID STIM HORMONE: CPT

## 2019-11-11 PROCEDURE — 80053 COMPREHEN METABOLIC PANEL: CPT

## 2019-11-11 PROCEDURE — 84439 ASSAY OF FREE THYROXINE: CPT

## 2019-11-11 PROCEDURE — 1036F TOBACCO NON-USER: CPT | Performed by: FAMILY MEDICINE

## 2019-11-11 PROCEDURE — G0439 PPPS, SUBSEQ VISIT: HCPCS | Performed by: FAMILY MEDICINE

## 2019-11-11 PROCEDURE — 99214 OFFICE O/P EST MOD 30 MIN: CPT | Performed by: FAMILY MEDICINE

## 2019-11-11 PROCEDURE — 3725F SCREEN DEPRESSION PERFORMED: CPT

## 2019-11-11 PROCEDURE — 36415 COLL VENOUS BLD VENIPUNCTURE: CPT

## 2019-11-11 PROCEDURE — 1160F RVW MEDS BY RX/DR IN RCRD: CPT

## 2019-11-11 PROCEDURE — 80061 LIPID PANEL: CPT

## 2019-11-11 NOTE — PROGRESS NOTES
Patient lives at home independently  Patient has no concerns about the status of his health at this time  Patient lives with spouse  Has an active social life and is not socially isolated  There have been no behavioral problems  Patient is completely independent  Able to dress, bathe, ambulate, feed self etc  Patient is able to drive an automobile  There are no limitations with the patient shopping, housekeeping, yard maintenance etc     Patient tries to eat a balanced diet incorporating both the of vegetables and lean meats  Patient gets exercise by trying to walk most days  20-30 minutes  There has been no unexplained weight loss or weight gain  Patient does have advanced directives  Patient's home is well lit and  uncluttered  Night lights are used at night  Grab bars are available in the bathroom  Patient's other healthcare providers, if any, are listed in the appropriate section of this chart  Patient's vital signs and BMI were reviewed and available on this chart  Screening for depression, urinary incontinence and fall risk were performed today and those results are available in the screening section of this chart  The patient is completely oriented alert and conversant  Appropriate thought and affect  The patient's "timed up and go test" is normal (easily under 12 seconds)    Colonoscopy normal, aged out for future  PSA, aged out

## 2019-11-11 NOTE — PROGRESS NOTES
BPH   Not tolerating tamsulosin, DC and observe  Not having many symptoms  Thyroid    Levothyroxine has been DC'd by the patient  He feels well  Past medical history, past surgical history, family medical history, social history, and medication list were all reviewed  Review of systems for GI  cardiac pulmonary and neurologic systems are all negative  ENT review of systems is also negative  /82 (BP Location: Left arm, Patient Position: Sitting, Cuff Size: Adult)   Pulse 64   Resp 16   Ht 5' 10" (1 778 m)   Wt 69 4 kg (153 lb)   BMI 21 95 kg/m²     HEENT examination is within normal limits no acute findings  Neck was supple  Chest clear  Cardiac exam revealed a regular rate and rhythm without murmur rub or gallop  Abdomen is soft and nontender  Will check TSH  Will observe for urinary symptoms  Will also do other screening blood work

## 2019-11-11 NOTE — PROGRESS NOTES
Assessment and Plan:     Problem List Items Addressed This Visit     None           Preventive health issues were discussed with patient, and age appropriate screening tests were ordered as noted in patient's After Visit Summary  Personalized health advice and appropriate referrals for health education or preventive services given if needed, as noted in patient's After Visit Summary       History of Present Illness:     Patient presents for Medicare Annual Wellness visit    Patient Care Team:  Sharyle Leaver, MD as PCP - Alvester Critchley, MD Emilia Ceo, MD Sharyle Leaver, MD     Problem List:     Patient Active Problem List   Diagnosis    Benign prostatic hyperplasia (BPH) with urinary urge incontinence    TSH elevation    Allergic rhinitis due to pollen    Elevated HDL      Past Medical and Surgical History:     Past Medical History:   Diagnosis Date    Basal cell carcinoma     Disease of thyroid gland      Past Surgical History:   Procedure Laterality Date    HERNIA REPAIR      1970's    OTHER SURGICAL HISTORY      ohs icrographic Surgery Face ; right upper lip      Family History:     Family History   Problem Relation Age of Onset    Heart attack Father     No Known Problems Sister     Coronary artery disease Brother       Social History:     Social History     Socioeconomic History    Marital status: /Civil Union     Spouse name: None    Number of children: None    Years of education: None    Highest education level: None   Occupational History    None   Social Needs    Financial resource strain: None    Food insecurity:     Worry: None     Inability: None    Transportation needs:     Medical: None     Non-medical: None   Tobacco Use    Smoking status: Never Smoker    Smokeless tobacco: Never Used    Tobacco comment: smoked for 1 year at age 13   Substance and Sexual Activity    Alcohol use: Yes     Comment: social    Drug use: No    Sexual activity: None   Lifestyle    Physical activity:     Days per week: None     Minutes per session: None    Stress: None   Relationships    Social connections:     Talks on phone: None     Gets together: None     Attends Hoahaoism service: None     Active member of club or organization: None     Attends meetings of clubs or organizations: None     Relationship status: None    Intimate partner violence:     Fear of current or ex partner: None     Emotionally abused: None     Physically abused: None     Forced sexual activity: None   Other Topics Concern    None   Social History Narrative    Caffeine use       Medications and Allergies:     Current Outpatient Medications   Medication Sig Dispense Refill    aspirin (ASPIRIN LOW DOSE) 81 MG tablet Take by mouth      levothyroxine 75 mcg tablet Take 1 tablet (75 mcg total) by mouth daily 30 tablet 2    Multiple Vitamin (DAILY VALUE MULTIVITAMIN) TABS Take by mouth      Saw Palmetto, Serenoa repens, ([employer] SAW PALMETTO) 450 MG CAPS Take by mouth      Sodium Fluoride (PREVIDENT 5000 BOOSTER) 1 1 % PSTE PreviDent 5000 Booster 1 1 % Dental Paste; 100 00; 0; 21-Apr-2012; 13-Dec-2010; Active      tamsulosin (FLOMAX) 0 4 mg TAKE 1 CAPSULE BY MOUTH EVERY DAY 90 capsule 2     No current facility-administered medications for this visit  No Known Allergies   Immunizations:     Immunization History   Administered Date(s) Administered    Influenza Split High Dose Preservative Free IM 09/30/2015, 10/20/2016, 10/24/2017    Influenza, high dose seasonal 0 5 mL 11/05/2018    Pneumococcal Conjugate 13-Valent 08/28/2017    Pneumococcal Polysaccharide PPV23 12/15/2008    Tdap 06/13/2012      Health Maintenance: There are no preventive care reminders to display for this patient        Topic Date Due    INFLUENZA VACCINE  07/01/2019      Medicare Health Risk Assessment:     /82 (BP Location: Left arm, Patient Position: Sitting, Cuff Size: Adult)   Pulse 64   Resp 16   Ht 5' 10" (1 778 m)   Wt 69 4 kg (153 lb)   BMI 21 95 kg/m²      Mary Wills is here for his Subsequent Wellness visit  Health Risk Assessment:   Patient rates overall health as very good  Patient feels that their physical health rating is same  Eyesight was rated as same  Hearing was rated as same  Patient feels that their emotional and mental health rating is same  Pain experienced in the last 7 days has been none  Patient states that he has experienced no weight loss or gain in last 6 months  Depression Screening:   PHQ-2 Score: 0      Fall Risk Screening: In the past year, patient has experienced: no history of falling in past year      Home Safety:  Patient does not have trouble with stairs inside or outside of their home  Patient has working smoke alarms and has working carbon monoxide detector  Home safety hazards include: none  Nutrition:   Current diet is Regular and No Added Salt  Medications:   Patient is currently taking over-the-counter supplements  OTC medications include: see medication list  Patient is able to manage medications  Activities of Daily Living (ADLs)/Instrumental Activities of Daily Living (IADLs):   Walk and transfer into and out of bed and chair?: Yes  Dress and groom yourself?: Yes    Bathe or shower yourself?: Yes    Feed yourself? Yes  Do your laundry/housekeeping?: Yes  Manage your money, pay your bills and track your expenses?: Yes  Make your own meals?: Yes    Do your own shopping?: Yes    Previous Hospitalizations:   Any hospitalizations or ED visits within the last 12 months?: No      Advance Care Planning:   Living will: Yes    Durable POA for healthcare:  Yes    Advanced directive: Yes    Advanced directive counseling given: Yes      PREVENTIVE SCREENINGS      Cardiovascular Screening:    General: Screening Current      Prostate Cancer Screening:    General: Screening Not Indicated      Freddy Latham MD

## 2020-07-14 ENCOUNTER — OFFICE VISIT (OUTPATIENT)
Dept: UROLOGY | Facility: AMBULATORY SURGERY CENTER | Age: 80
End: 2020-07-14
Payer: COMMERCIAL

## 2020-07-14 VITALS
DIASTOLIC BLOOD PRESSURE: 70 MMHG | HEART RATE: 75 BPM | SYSTOLIC BLOOD PRESSURE: 130 MMHG | BODY MASS INDEX: 21.33 KG/M2 | WEIGHT: 149 LBS | HEIGHT: 70 IN | TEMPERATURE: 98 F

## 2020-07-14 DIAGNOSIS — N40.1 BPH WITH URINARY OBSTRUCTION: Primary | ICD-10-CM

## 2020-07-14 DIAGNOSIS — N13.8 BPH WITH URINARY OBSTRUCTION: Primary | ICD-10-CM

## 2020-07-14 LAB — POST-VOID RESIDUAL VOLUME, ML POC: 0 ML

## 2020-07-14 PROCEDURE — 1160F RVW MEDS BY RX/DR IN RCRD: CPT | Performed by: NURSE PRACTITIONER

## 2020-07-14 PROCEDURE — 4040F PNEUMOC VAC/ADMIN/RCVD: CPT | Performed by: NURSE PRACTITIONER

## 2020-07-14 PROCEDURE — 51798 US URINE CAPACITY MEASURE: CPT | Performed by: NURSE PRACTITIONER

## 2020-07-14 PROCEDURE — 1036F TOBACCO NON-USER: CPT | Performed by: NURSE PRACTITIONER

## 2020-07-14 PROCEDURE — 99213 OFFICE O/P EST LOW 20 MIN: CPT | Performed by: NURSE PRACTITIONER

## 2020-07-14 PROCEDURE — 3008F BODY MASS INDEX DOCD: CPT | Performed by: NURSE PRACTITIONER

## 2020-07-14 NOTE — PROGRESS NOTES
7/14/2020    Marie Li  1940  [id number]      Assessment  -BPH with LUTS    Discussion/Plan  Marleny Garcia is a 67 y o  male being managed by Dr Sunshine Deleon  1  BPH with LUTS- PVR is 0 mL today  Patient continues to do well without any urinary complaints  He wishes to remain off BPH medications and will continue to take saw palmetto daily  We reviewed dietary and behavioral modifications  Patient will follow up on as-needed basis and call our office with any issues    -All questions answered, patient agrees with plan      History of Present Illness  67 y o  male with a history of BPH with LUTS presents today for follow up  At last office visit, patient had discontinued oxybutynin and Flomax due to improvement in his urinary pattern  He continues to experience mild urinary urgency, but does not find bothersome  Patient denies any episodes of incontinence  No reports of gross hematuria or dysuria  He continues to take saw palmetto daily  No additional changes to his overall health  Review of Systems  Review of Systems   Constitutional: Negative  HENT: Negative  Respiratory: Negative  Cardiovascular: Negative  Gastrointestinal: Negative  Genitourinary: Negative for decreased urine volume, difficulty urinating, dysuria, flank pain, frequency, hematuria and urgency  Musculoskeletal: Negative  Skin: Negative  Neurological: Negative  Psychiatric/Behavioral: Negative          Past Medical History  Past Medical History:   Diagnosis Date    Basal cell carcinoma     Disease of thyroid gland        Past Social History  Past Surgical History:   Procedure Laterality Date    HERNIA REPAIR      1970's    OTHER SURGICAL HISTORY      ohs icrographic Surgery Face ; right upper lip       Past Family History  Family History   Problem Relation Age of Onset    Heart attack Father     No Known Problems Sister     Coronary artery disease Brother        Past Social history  Social History     Socioeconomic History    Marital status: /Civil Union     Spouse name: Not on file    Number of children: Not on file    Years of education: Not on file    Highest education level: Not on file   Occupational History    Not on file   Social Needs    Financial resource strain: Not on file    Food insecurity:     Worry: Not on file     Inability: Not on file    Transportation needs:     Medical: Not on file     Non-medical: Not on file   Tobacco Use    Smoking status: Never Smoker    Smokeless tobacco: Never Used    Tobacco comment: smoked for 1 year at age 13   Substance and Sexual Activity    Alcohol use: Yes     Comment: social    Drug use: No    Sexual activity: Not on file   Lifestyle    Physical activity:     Days per week: Not on file     Minutes per session: Not on file    Stress: Not on file   Relationships    Social connections:     Talks on phone: Not on file     Gets together: Not on file     Attends Judaism service: Not on file     Active member of club or organization: Not on file     Attends meetings of clubs or organizations: Not on file     Relationship status: Not on file    Intimate partner violence:     Fear of current or ex partner: Not on file     Emotionally abused: Not on file     Physically abused: Not on file     Forced sexual activity: Not on file   Other Topics Concern    Not on file   Social History Narrative    Caffeine use       Current Medications  Current Outpatient Medications   Medication Sig Dispense Refill    aspirin (ASPIRIN LOW DOSE) 81 MG tablet Take by mouth      levothyroxine 75 mcg tablet Take 1 tablet (75 mcg total) by mouth daily 30 tablet 2    Multiple Vitamin (DAILY VALUE MULTIVITAMIN) TABS Take by mouth      Saw Palmetto, Serenoa repens, ([employer] SAW PALMETTO) 450 MG CAPS Take by mouth      Sodium Fluoride (PREVIDENT 5000 BOOSTER) 1 1 % PSTE PreviDent 5000 Booster 1 1 % Dental Paste; 100 00; 0; 21-Apr-2012; 13-Dec-2010;  Active No current facility-administered medications for this visit  Allergies  No Known Allergies    Past Medical History, Social History, Family History, medications and allergies were reviewed  Vitals  Vitals:    07/14/20 1112   BP: 130/70   Pulse: 75   Temp: 98 °F (36 7 °C)   TempSrc: Temporal   Weight: 67 6 kg (149 lb)   Height: 5' 10" (1 778 m)       Physical Exam  Physical Exam   Constitutional: He is oriented to person, place, and time  He appears well-developed and well-nourished  HENT:   Head: Normocephalic  Eyes: Pupils are equal, round, and reactive to light  Neck: Normal range of motion  Pulmonary/Chest: Effort normal    Abdominal: Soft  Normal appearance  There is no CVA tenderness  Musculoskeletal: Normal range of motion  Neurological: He is alert and oriented to person, place, and time  Skin: Skin is warm and dry  Psychiatric: He has a normal mood and affect   His behavior is normal  Judgment and thought content normal        Results    I have personally reviewed all pertinent lab results and reviewed with patient  Lab Results   Component Value Date    PSA 1 5 10/30/2018    PSA 1 7 08/18/2017    PSA 1 4 08/05/2016     Lab Results   Component Value Date    GLUCOSE 88 07/29/2015    CALCIUM 8 9 11/11/2019     07/29/2015    K 4 4 11/11/2019    CO2 27 11/11/2019     11/11/2019    BUN 25 11/11/2019    CREATININE 0 72 11/11/2019     Lab Results   Component Value Date    WBC 6 06 11/11/2019    HGB 14 2 11/11/2019    HCT 44 7 11/11/2019    MCV 93 11/11/2019     11/11/2019     Recent Results (from the past 1 hour(s))   POCT Measure PVR    Collection Time: 07/14/20 11:15 AM   Result Value Ref Range    POST-VOID RESIDUAL VOLUME, ML POC 0 mL

## 2020-10-07 ENCOUNTER — IMMUNIZATIONS (OUTPATIENT)
Dept: FAMILY MEDICINE CLINIC | Facility: MEDICAL CENTER | Age: 80
End: 2020-10-07
Payer: COMMERCIAL

## 2020-10-07 DIAGNOSIS — Z23 ENCOUNTER FOR IMMUNIZATION: ICD-10-CM

## 2020-10-07 PROCEDURE — 90662 IIV NO PRSV INCREASED AG IM: CPT

## 2020-10-07 PROCEDURE — G0008 ADMIN INFLUENZA VIRUS VAC: HCPCS

## 2020-11-19 ENCOUNTER — TELEPHONE (OUTPATIENT)
Dept: FAMILY MEDICINE CLINIC | Facility: MEDICAL CENTER | Age: 80
End: 2020-11-19

## 2020-11-19 ENCOUNTER — OFFICE VISIT (OUTPATIENT)
Dept: FAMILY MEDICINE CLINIC | Facility: MEDICAL CENTER | Age: 80
End: 2020-11-19
Payer: COMMERCIAL

## 2020-11-19 ENCOUNTER — APPOINTMENT (OUTPATIENT)
Dept: LAB | Facility: MEDICAL CENTER | Age: 80
End: 2020-11-19
Payer: COMMERCIAL

## 2020-11-19 VITALS
OXYGEN SATURATION: 97 % | BODY MASS INDEX: 21.27 KG/M2 | HEIGHT: 70 IN | SYSTOLIC BLOOD PRESSURE: 126 MMHG | TEMPERATURE: 97.8 F | DIASTOLIC BLOOD PRESSURE: 70 MMHG | WEIGHT: 148.6 LBS | HEART RATE: 68 BPM

## 2020-11-19 DIAGNOSIS — E78.89 ELEVATED HDL: ICD-10-CM

## 2020-11-19 DIAGNOSIS — E55.9 VITAMIN D DEFICIENCY: ICD-10-CM

## 2020-11-19 DIAGNOSIS — Z85.828 HISTORY OF BASAL CELL CARCINOMA: ICD-10-CM

## 2020-11-19 DIAGNOSIS — N40.1 BENIGN PROSTATIC HYPERPLASIA (BPH) WITH URINARY URGE INCONTINENCE: ICD-10-CM

## 2020-11-19 DIAGNOSIS — N39.41 BENIGN PROSTATIC HYPERPLASIA (BPH) WITH URINARY URGE INCONTINENCE: ICD-10-CM

## 2020-11-19 DIAGNOSIS — Z00.00 ROUTINE GENERAL MEDICAL EXAMINATION AT A HEALTH CARE FACILITY: Primary | ICD-10-CM

## 2020-11-19 DIAGNOSIS — E03.9 ACQUIRED HYPOTHYROIDISM: ICD-10-CM

## 2020-11-19 DIAGNOSIS — L98.9 SKIN LESION OF NECK: ICD-10-CM

## 2020-11-19 LAB
25(OH)D3 SERPL-MCNC: 44.9 NG/ML (ref 30–100)
ALBUMIN SERPL BCP-MCNC: 3.7 G/DL (ref 3.5–5)
ALP SERPL-CCNC: 47 U/L (ref 46–116)
ALT SERPL W P-5'-P-CCNC: 20 U/L (ref 12–78)
ANION GAP SERPL CALCULATED.3IONS-SCNC: 5 MMOL/L (ref 4–13)
AST SERPL W P-5'-P-CCNC: 18 U/L (ref 5–45)
BACTERIA UR QL AUTO: NORMAL /HPF
BASOPHILS # BLD AUTO: 0.08 THOUSANDS/ΜL (ref 0–0.1)
BASOPHILS NFR BLD AUTO: 2 % (ref 0–1)
BILIRUB SERPL-MCNC: 0.76 MG/DL (ref 0.2–1)
BILIRUB UR QL STRIP: NEGATIVE
BUN SERPL-MCNC: 27 MG/DL (ref 5–25)
CALCIUM SERPL-MCNC: 9.3 MG/DL (ref 8.3–10.1)
CHLORIDE SERPL-SCNC: 106 MMOL/L (ref 100–108)
CHOLEST SERPL-MCNC: 192 MG/DL (ref 50–200)
CLARITY UR: CLEAR
CO2 SERPL-SCNC: 29 MMOL/L (ref 21–32)
COLOR UR: ABNORMAL
CREAT SERPL-MCNC: 0.69 MG/DL (ref 0.6–1.3)
EOSINOPHIL # BLD AUTO: 0.2 THOUSAND/ΜL (ref 0–0.61)
EOSINOPHIL NFR BLD AUTO: 4 % (ref 0–6)
ERYTHROCYTE [DISTWIDTH] IN BLOOD BY AUTOMATED COUNT: 13.5 % (ref 11.6–15.1)
GFR SERPL CREATININE-BSD FRML MDRD: 90 ML/MIN/1.73SQ M
GLUCOSE P FAST SERPL-MCNC: 88 MG/DL (ref 65–99)
GLUCOSE UR STRIP-MCNC: NEGATIVE MG/DL
HCT VFR BLD AUTO: 48.2 % (ref 36.5–49.3)
HDLC SERPL-MCNC: 92 MG/DL
HGB BLD-MCNC: 15 G/DL (ref 12–17)
HGB UR QL STRIP.AUTO: NEGATIVE
HYALINE CASTS #/AREA URNS LPF: NORMAL /LPF
IMM GRANULOCYTES # BLD AUTO: 0.01 THOUSAND/UL (ref 0–0.2)
IMM GRANULOCYTES NFR BLD AUTO: 0 % (ref 0–2)
KETONES UR STRIP-MCNC: ABNORMAL MG/DL
LDLC SERPL CALC-MCNC: 91 MG/DL (ref 0–100)
LEUKOCYTE ESTERASE UR QL STRIP: NEGATIVE
LYMPHOCYTES # BLD AUTO: 0.8 THOUSANDS/ΜL (ref 0.6–4.47)
LYMPHOCYTES NFR BLD AUTO: 15 % (ref 14–44)
MCH RBC QN AUTO: 29.3 PG (ref 26.8–34.3)
MCHC RBC AUTO-ENTMCNC: 31.1 G/DL (ref 31.4–37.4)
MCV RBC AUTO: 94 FL (ref 82–98)
MONOCYTES # BLD AUTO: 0.58 THOUSAND/ΜL (ref 0.17–1.22)
MONOCYTES NFR BLD AUTO: 11 % (ref 4–12)
NEUTROPHILS # BLD AUTO: 3.56 THOUSANDS/ΜL (ref 1.85–7.62)
NEUTS SEG NFR BLD AUTO: 68 % (ref 43–75)
NITRITE UR QL STRIP: NEGATIVE
NON-SQ EPI CELLS URNS QL MICRO: NORMAL /HPF
NONHDLC SERPL-MCNC: 100 MG/DL
NRBC BLD AUTO-RTO: 0 /100 WBCS
PH UR STRIP.AUTO: 5.5 [PH]
PLATELET # BLD AUTO: 222 THOUSANDS/UL (ref 149–390)
PMV BLD AUTO: 10.7 FL (ref 8.9–12.7)
POTASSIUM SERPL-SCNC: 4.6 MMOL/L (ref 3.5–5.3)
PROT SERPL-MCNC: 7.2 G/DL (ref 6.4–8.2)
PROT UR STRIP-MCNC: ABNORMAL MG/DL
PSA SERPL-MCNC: 1.6 NG/ML (ref 0–4)
RBC # BLD AUTO: 5.12 MILLION/UL (ref 3.88–5.62)
RBC #/AREA URNS AUTO: NORMAL /HPF
SODIUM SERPL-SCNC: 140 MMOL/L (ref 136–145)
SP GR UR STRIP.AUTO: 1.03 (ref 1–1.03)
T4 FREE SERPL-MCNC: 0.96 NG/DL (ref 0.76–1.46)
TRIGL SERPL-MCNC: 43 MG/DL
TSH SERPL DL<=0.05 MIU/L-ACNC: 4.2 UIU/ML (ref 0.36–3.74)
UROBILINOGEN UR QL STRIP.AUTO: 0.2 E.U./DL
WBC # BLD AUTO: 5.23 THOUSAND/UL (ref 4.31–10.16)
WBC #/AREA URNS AUTO: NORMAL /HPF

## 2020-11-19 PROCEDURE — 1125F AMNT PAIN NOTED PAIN PRSNT: CPT | Performed by: NURSE PRACTITIONER

## 2020-11-19 PROCEDURE — 84439 ASSAY OF FREE THYROXINE: CPT

## 2020-11-19 PROCEDURE — 1036F TOBACCO NON-USER: CPT | Performed by: NURSE PRACTITIONER

## 2020-11-19 PROCEDURE — 3288F FALL RISK ASSESSMENT DOCD: CPT | Performed by: NURSE PRACTITIONER

## 2020-11-19 PROCEDURE — 1101F PT FALLS ASSESS-DOCD LE1/YR: CPT | Performed by: NURSE PRACTITIONER

## 2020-11-19 PROCEDURE — 3725F SCREEN DEPRESSION PERFORMED: CPT | Performed by: NURSE PRACTITIONER

## 2020-11-19 PROCEDURE — 80053 COMPREHEN METABOLIC PANEL: CPT

## 2020-11-19 PROCEDURE — G0103 PSA SCREENING: HCPCS

## 2020-11-19 PROCEDURE — 80061 LIPID PANEL: CPT

## 2020-11-19 PROCEDURE — 85025 COMPLETE CBC W/AUTO DIFF WBC: CPT

## 2020-11-19 PROCEDURE — 82306 VITAMIN D 25 HYDROXY: CPT

## 2020-11-19 PROCEDURE — 36415 COLL VENOUS BLD VENIPUNCTURE: CPT

## 2020-11-19 PROCEDURE — 1160F RVW MEDS BY RX/DR IN RCRD: CPT | Performed by: NURSE PRACTITIONER

## 2020-11-19 PROCEDURE — 1170F FXNL STATUS ASSESSED: CPT | Performed by: NURSE PRACTITIONER

## 2020-11-19 PROCEDURE — 81001 URINALYSIS AUTO W/SCOPE: CPT | Performed by: NURSE PRACTITIONER

## 2020-11-19 PROCEDURE — G0439 PPPS, SUBSEQ VISIT: HCPCS | Performed by: NURSE PRACTITIONER

## 2020-11-19 PROCEDURE — 84443 ASSAY THYROID STIM HORMONE: CPT

## 2020-11-20 ENCOUNTER — TELEPHONE (OUTPATIENT)
Dept: FAMILY MEDICINE CLINIC | Facility: MEDICAL CENTER | Age: 80
End: 2020-11-20

## 2021-01-27 DIAGNOSIS — Z23 ENCOUNTER FOR IMMUNIZATION: ICD-10-CM

## 2021-02-13 ENCOUNTER — IMMUNIZATIONS (OUTPATIENT)
Dept: FAMILY MEDICINE CLINIC | Facility: HOSPITAL | Age: 81
End: 2021-02-13

## 2021-02-13 DIAGNOSIS — Z23 ENCOUNTER FOR IMMUNIZATION: Primary | ICD-10-CM

## 2021-02-13 PROCEDURE — 91300 SARS-COV-2 / COVID-19 MRNA VACCINE (PFIZER-BIONTECH) 30 MCG: CPT

## 2021-02-13 PROCEDURE — 0001A SARS-COV-2 / COVID-19 MRNA VACCINE (PFIZER-BIONTECH) 30 MCG: CPT

## 2021-03-08 ENCOUNTER — IMMUNIZATIONS (OUTPATIENT)
Dept: FAMILY MEDICINE CLINIC | Facility: HOSPITAL | Age: 81
End: 2021-03-08

## 2021-03-08 DIAGNOSIS — Z23 ENCOUNTER FOR IMMUNIZATION: Primary | ICD-10-CM

## 2021-03-08 PROCEDURE — 0002A SARS-COV-2 / COVID-19 MRNA VACCINE (PFIZER-BIONTECH) 30 MCG: CPT

## 2021-03-08 PROCEDURE — 91300 SARS-COV-2 / COVID-19 MRNA VACCINE (PFIZER-BIONTECH) 30 MCG: CPT

## 2021-09-25 ENCOUNTER — IMMUNIZATIONS (OUTPATIENT)
Dept: FAMILY MEDICINE CLINIC | Facility: HOSPITAL | Age: 81
End: 2021-09-25

## 2021-09-25 DIAGNOSIS — Z23 ENCOUNTER FOR IMMUNIZATION: Primary | ICD-10-CM

## 2021-09-25 PROCEDURE — 0001A SARS-COV-2 / COVID-19 MRNA VACCINE (PFIZER-BIONTECH) 30 MCG: CPT

## 2021-09-25 PROCEDURE — 91300 SARS-COV-2 / COVID-19 MRNA VACCINE (PFIZER-BIONTECH) 30 MCG: CPT

## 2021-09-29 ENCOUNTER — VBI (OUTPATIENT)
Dept: ADMINISTRATIVE | Facility: OTHER | Age: 81
End: 2021-09-29

## 2021-10-20 ENCOUNTER — IMMUNIZATIONS (OUTPATIENT)
Dept: FAMILY MEDICINE CLINIC | Facility: MEDICAL CENTER | Age: 81
End: 2021-10-20
Payer: COMMERCIAL

## 2021-10-20 DIAGNOSIS — Z23 ENCOUNTER FOR IMMUNIZATION: Primary | ICD-10-CM

## 2021-10-20 PROCEDURE — G0008 ADMIN INFLUENZA VIRUS VAC: HCPCS

## 2021-10-20 PROCEDURE — 90662 IIV NO PRSV INCREASED AG IM: CPT

## 2021-11-12 ENCOUNTER — RA CDI HCC (OUTPATIENT)
Dept: OTHER | Facility: HOSPITAL | Age: 81
End: 2021-11-12

## 2021-11-19 ENCOUNTER — OFFICE VISIT (OUTPATIENT)
Dept: FAMILY MEDICINE CLINIC | Facility: MEDICAL CENTER | Age: 81
End: 2021-11-19
Payer: COMMERCIAL

## 2021-11-19 VITALS
OXYGEN SATURATION: 100 % | WEIGHT: 157.2 LBS | HEIGHT: 70 IN | DIASTOLIC BLOOD PRESSURE: 78 MMHG | TEMPERATURE: 98.3 F | BODY MASS INDEX: 22.5 KG/M2 | HEART RATE: 79 BPM | SYSTOLIC BLOOD PRESSURE: 130 MMHG

## 2021-11-19 DIAGNOSIS — Z13.0 SCREENING, ANEMIA, DEFICIENCY, IRON: ICD-10-CM

## 2021-11-19 DIAGNOSIS — Z13.1 SCREENING FOR DIABETES MELLITUS: ICD-10-CM

## 2021-11-19 DIAGNOSIS — I49.9 IRREGULAR HEART RHYTHM: ICD-10-CM

## 2021-11-19 DIAGNOSIS — I48.20 CHRONIC ATRIAL FIBRILLATION (HCC): ICD-10-CM

## 2021-11-19 DIAGNOSIS — I48.91 ATRIAL FIBRILLATION, UNSPECIFIED TYPE (HCC): ICD-10-CM

## 2021-11-19 DIAGNOSIS — Z00.00 PREVENTATIVE HEALTH CARE: Primary | ICD-10-CM

## 2021-11-19 PROCEDURE — 93000 ELECTROCARDIOGRAM COMPLETE: CPT | Performed by: FAMILY MEDICINE

## 2021-11-19 PROCEDURE — 99214 OFFICE O/P EST MOD 30 MIN: CPT | Performed by: FAMILY MEDICINE

## 2021-11-19 PROCEDURE — 1170F FXNL STATUS ASSESSED: CPT | Performed by: FAMILY MEDICINE

## 2021-11-19 PROCEDURE — 1125F AMNT PAIN NOTED PAIN PRSNT: CPT | Performed by: FAMILY MEDICINE

## 2021-11-19 PROCEDURE — 3725F SCREEN DEPRESSION PERFORMED: CPT | Performed by: FAMILY MEDICINE

## 2021-11-19 PROCEDURE — 3288F FALL RISK ASSESSMENT DOCD: CPT | Performed by: FAMILY MEDICINE

## 2021-11-19 PROCEDURE — G0439 PPPS, SUBSEQ VISIT: HCPCS | Performed by: FAMILY MEDICINE

## 2021-11-20 ENCOUNTER — APPOINTMENT (OUTPATIENT)
Dept: LAB | Facility: MEDICAL CENTER | Age: 81
End: 2021-11-20
Payer: COMMERCIAL

## 2021-11-20 DIAGNOSIS — Z13.1 SCREENING FOR DIABETES MELLITUS: ICD-10-CM

## 2021-11-20 DIAGNOSIS — Z13.0 SCREENING, ANEMIA, DEFICIENCY, IRON: ICD-10-CM

## 2021-11-20 LAB
ANION GAP SERPL CALCULATED.3IONS-SCNC: 6 MMOL/L (ref 4–13)
BASOPHILS # BLD AUTO: 0.09 THOUSANDS/ΜL (ref 0–0.1)
BASOPHILS NFR BLD AUTO: 1 % (ref 0–1)
BUN SERPL-MCNC: 20 MG/DL (ref 5–25)
CALCIUM SERPL-MCNC: 8.9 MG/DL (ref 8.3–10.1)
CHLORIDE SERPL-SCNC: 105 MMOL/L (ref 100–108)
CO2 SERPL-SCNC: 27 MMOL/L (ref 21–32)
CREAT SERPL-MCNC: 0.82 MG/DL (ref 0.6–1.3)
EOSINOPHIL # BLD AUTO: 0.36 THOUSAND/ΜL (ref 0–0.61)
EOSINOPHIL NFR BLD AUTO: 6 % (ref 0–6)
ERYTHROCYTE [DISTWIDTH] IN BLOOD BY AUTOMATED COUNT: 13.8 % (ref 11.6–15.1)
GFR SERPL CREATININE-BSD FRML MDRD: 84 ML/MIN/1.73SQ M
GLUCOSE SERPL-MCNC: 90 MG/DL (ref 65–140)
HCT VFR BLD AUTO: 47.4 % (ref 36.5–49.3)
HGB BLD-MCNC: 15 G/DL (ref 12–17)
IMM GRANULOCYTES # BLD AUTO: 0.02 THOUSAND/UL (ref 0–0.2)
IMM GRANULOCYTES NFR BLD AUTO: 0 % (ref 0–2)
LYMPHOCYTES # BLD AUTO: 0.77 THOUSANDS/ΜL (ref 0.6–4.47)
LYMPHOCYTES NFR BLD AUTO: 12 % (ref 14–44)
MCH RBC QN AUTO: 29.3 PG (ref 26.8–34.3)
MCHC RBC AUTO-ENTMCNC: 31.6 G/DL (ref 31.4–37.4)
MCV RBC AUTO: 93 FL (ref 82–98)
MONOCYTES # BLD AUTO: 0.66 THOUSAND/ΜL (ref 0.17–1.22)
MONOCYTES NFR BLD AUTO: 10 % (ref 4–12)
NEUTROPHILS # BLD AUTO: 4.64 THOUSANDS/ΜL (ref 1.85–7.62)
NEUTS SEG NFR BLD AUTO: 71 % (ref 43–75)
NRBC BLD AUTO-RTO: 0 /100 WBCS
PLATELET # BLD AUTO: 190 THOUSANDS/UL (ref 149–390)
PMV BLD AUTO: 11.3 FL (ref 8.9–12.7)
POTASSIUM SERPL-SCNC: 4.3 MMOL/L (ref 3.5–5.3)
RBC # BLD AUTO: 5.12 MILLION/UL (ref 3.88–5.62)
SODIUM SERPL-SCNC: 138 MMOL/L (ref 136–145)
WBC # BLD AUTO: 6.54 THOUSAND/UL (ref 4.31–10.16)

## 2021-11-20 PROCEDURE — 36415 COLL VENOUS BLD VENIPUNCTURE: CPT

## 2021-11-20 PROCEDURE — 85025 COMPLETE CBC W/AUTO DIFF WBC: CPT

## 2021-11-20 PROCEDURE — 80048 BASIC METABOLIC PNL TOTAL CA: CPT

## 2021-11-23 ENCOUNTER — OFFICE VISIT (OUTPATIENT)
Dept: CARDIOLOGY CLINIC | Facility: MEDICAL CENTER | Age: 81
End: 2021-11-23
Payer: COMMERCIAL

## 2021-11-23 ENCOUNTER — APPOINTMENT (OUTPATIENT)
Dept: LAB | Facility: MEDICAL CENTER | Age: 81
End: 2021-11-23
Payer: COMMERCIAL

## 2021-11-23 VITALS
BODY MASS INDEX: 21.9 KG/M2 | SYSTOLIC BLOOD PRESSURE: 120 MMHG | DIASTOLIC BLOOD PRESSURE: 82 MMHG | HEART RATE: 68 BPM | HEIGHT: 70 IN | OXYGEN SATURATION: 96 % | WEIGHT: 153 LBS

## 2021-11-23 DIAGNOSIS — I48.91 ATRIAL FIBRILLATION, UNSPECIFIED TYPE (HCC): ICD-10-CM

## 2021-11-23 LAB — TSH SERPL DL<=0.05 MIU/L-ACNC: 6.41 UIU/ML (ref 0.36–3.74)

## 2021-11-23 PROCEDURE — 36415 COLL VENOUS BLD VENIPUNCTURE: CPT | Performed by: INTERNAL MEDICINE

## 2021-11-23 PROCEDURE — 1036F TOBACCO NON-USER: CPT | Performed by: INTERNAL MEDICINE

## 2021-11-23 PROCEDURE — 99204 OFFICE O/P NEW MOD 45 MIN: CPT | Performed by: INTERNAL MEDICINE

## 2021-11-23 PROCEDURE — 84443 ASSAY THYROID STIM HORMONE: CPT | Performed by: INTERNAL MEDICINE

## 2021-11-23 PROCEDURE — 1160F RVW MEDS BY RX/DR IN RCRD: CPT | Performed by: INTERNAL MEDICINE

## 2021-11-23 RX ORDER — VIT C/HESPERIDIN/BIOFLAVONOIDS 500-100 MG
TABLET ORAL
COMMUNITY

## 2021-11-23 RX ORDER — ST. JOHN'S WORT 300 MG
CAPSULE ORAL
COMMUNITY

## 2021-11-23 RX ORDER — CALCIUM CARBONATE/VITAMIN D3 600 MG-10
TABLET ORAL
COMMUNITY

## 2021-11-23 RX ORDER — VITAMIN B COMPLEX
TABLET ORAL
COMMUNITY

## 2021-11-23 RX ORDER — CYANOCOBALAMIN (VITAMIN B-12) 500 MCG
LOZENGE ORAL
COMMUNITY

## 2021-11-23 RX ORDER — OXYBUTYNIN CHLORIDE 10 MG/1
TABLET, EXTENDED RELEASE ORAL
COMMUNITY
End: 2022-04-26

## 2021-11-23 RX ORDER — MULTIVIT-MIN/IRON/FOLIC ACID/K 18-600-40
CAPSULE ORAL
COMMUNITY

## 2021-11-26 ENCOUNTER — TELEPHONE (OUTPATIENT)
Dept: CARDIOLOGY CLINIC | Facility: CLINIC | Age: 81
End: 2021-11-26

## 2021-11-29 ENCOUNTER — TELEPHONE (OUTPATIENT)
Dept: FAMILY MEDICINE CLINIC | Facility: MEDICAL CENTER | Age: 81
End: 2021-11-29

## 2021-12-06 DIAGNOSIS — I48.20 CHRONIC ATRIAL FIBRILLATION (HCC): ICD-10-CM

## 2021-12-29 ENCOUNTER — HOSPITAL ENCOUNTER (OUTPATIENT)
Dept: NON INVASIVE DIAGNOSTICS | Facility: MEDICAL CENTER | Age: 81
Discharge: HOME | End: 2021-12-29
Payer: COMMERCIAL

## 2021-12-29 VITALS
WEIGHT: 153 LBS | HEIGHT: 70 IN | DIASTOLIC BLOOD PRESSURE: 82 MMHG | SYSTOLIC BLOOD PRESSURE: 120 MMHG | HEART RATE: 89 BPM | BODY MASS INDEX: 21.9 KG/M2

## 2021-12-29 DIAGNOSIS — I48.91 ATRIAL FIBRILLATION, UNSPECIFIED TYPE (HCC): ICD-10-CM

## 2021-12-29 LAB
AORTIC ROOT: 3.7 CM
APICAL FOUR CHAMBER EJECTION FRACTION: 64 %
ASCENDING AORTA: 3.4 CM
AV LVOT PEAK GRADIENT: 2 MMHG
AV PEAK GRADIENT: 5 MMHG
E WAVE DECELERATION TIME: 174 MS
FRACTIONAL SHORTENING: 47 % (ref 28–44)
INTERVENTRICULAR SEPTUM IN DIASTOLE (PARASTERNAL SHORT AXIS VIEW): 1.2 CM
LEFT ATRIUM AREA SYSTOLE SINGLE PLANE A4C: 22.2 CM2
LEFT INTERNAL DIMENSION IN SYSTOLE: 1.9 CM (ref 2.1–4)
LEFT VENTRICULAR INTERNAL DIMENSION IN DIASTOLE: 3.6 CM (ref 4.21–6.27)
LEFT VENTRICULAR POSTERIOR WALL IN END DIASTOLE: 1.1 CM
LEFT VENTRICULAR STROKE VOLUME: 45 ML
MV E'TISSUE VEL-LAT: 12 CM/S
MV E'TISSUE VEL-SEP: 10 CM/S
MV PEAK E VEL: 87 CM/S
MV STENOSIS PRESSURE HALF TIME: 0 MS
PA SYSTOLIC PRESSURE: 21 MMHG
RA PRESSURE ESTIMATED: 5 MMHG
RIGHT ATRIUM AREA SYSTOLE A4C: 14.5 CM2
RIGHT VENTRICLE ID DIMENSION: 3.1 CM
RV PSP: 24 MMHG
SL CV LV EF: 60
SL CV PED ECHO LEFT VENTRICLE DIASTOLIC VOLUME (MOD BIPLANE) 2D: 56 ML
SL CV PED ECHO LEFT VENTRICLE SYSTOLIC VOLUME (MOD BIPLANE) 2D: 11 ML
TR MAX PG: 19 MMHG
TR PEAK VELOCITY: 2.2 M/S
TRICUSPID VALVE PEAK REGURGITATION VELOCITY: 2.17 M/S
TRICUSPID VALVE S': 12 CM/S
Z-SCORE OF LEFT VENTRICULAR DIMENSION IN END SYSTOLE: -3.56

## 2021-12-29 PROCEDURE — 93306 TTE W/DOPPLER COMPLETE: CPT | Performed by: INTERNAL MEDICINE

## 2021-12-29 PROCEDURE — 93306 TTE W/DOPPLER COMPLETE: CPT

## 2022-01-05 ENCOUNTER — OFFICE VISIT (OUTPATIENT)
Dept: CARDIOLOGY CLINIC | Facility: MEDICAL CENTER | Age: 82
End: 2022-01-05
Payer: COMMERCIAL

## 2022-01-05 VITALS
BODY MASS INDEX: 21.9 KG/M2 | SYSTOLIC BLOOD PRESSURE: 126 MMHG | HEIGHT: 70 IN | HEART RATE: 68 BPM | OXYGEN SATURATION: 97 % | DIASTOLIC BLOOD PRESSURE: 68 MMHG | WEIGHT: 153 LBS

## 2022-01-05 DIAGNOSIS — I34.0 NONRHEUMATIC MITRAL VALVE REGURGITATION: ICD-10-CM

## 2022-01-05 DIAGNOSIS — I48.91 ATRIAL FIBRILLATION, UNSPECIFIED TYPE (HCC): Primary | ICD-10-CM

## 2022-01-05 PROCEDURE — 1036F TOBACCO NON-USER: CPT | Performed by: INTERNAL MEDICINE

## 2022-01-05 PROCEDURE — 1160F RVW MEDS BY RX/DR IN RCRD: CPT | Performed by: INTERNAL MEDICINE

## 2022-01-05 PROCEDURE — 93000 ELECTROCARDIOGRAM COMPLETE: CPT | Performed by: INTERNAL MEDICINE

## 2022-01-05 PROCEDURE — 99214 OFFICE O/P EST MOD 30 MIN: CPT | Performed by: INTERNAL MEDICINE

## 2022-01-05 RX ORDER — METOPROLOL SUCCINATE 25 MG/1
25 TABLET, EXTENDED RELEASE ORAL DAILY
Qty: 90 TABLET | Refills: 3 | Status: SHIPPED | OUTPATIENT
Start: 2022-01-05

## 2022-01-05 NOTE — PATIENT INSTRUCTIONS
Recommendations:  1  Start metoprolol succinate 25mg daily  2  Continue remainder of medications  3  Hold on cardioversion at this time  4  Follow up in 4 months

## 2022-01-05 NOTE — PROGRESS NOTES
Cardiology   Jahaira Fu [de-identified] y o  male MRN: [id number]        Impression:  1  New onset atrial fibrillation - asymptomatic  Started on Eliquis  Asymptomatic  2  Mitral regurgitation - moderate      Recommendations:  1  Start metoprolol succinate 25mg daily  2  Continue remainder of medications  3  Hold on cardioversion at this time  4  Follow up in 4 months  HPI: Jahaira Fu is a [de-identified]y o  year old male with paroxysmal atrial fibrillation who presents for follow up  Asymptomatic  No chest pain, shortness of breath, or palpitations  Echo demonstrated normal LV systolic function, mild LVH, and moderate mitral regurgitation  TSH elevated  Review of Systems   Constitutional: Negative  HENT: Negative  Eyes: Negative  Respiratory: Negative for chest tightness and shortness of breath  Cardiovascular: Negative for chest pain, palpitations and leg swelling  Gastrointestinal: Negative  Endocrine: Negative  Genitourinary: Negative  Musculoskeletal: Negative  Skin: Negative  Allergic/Immunologic: Negative  Neurological: Negative  Hematological: Negative  Psychiatric/Behavioral: Negative  All other systems reviewed and are negative          Past Medical History:   Diagnosis Date    Allergic 1956    Basal cell carcinoma     Cancer ([facility] [address] ) unknown    Disease of thyroid gland     Visual impairment unknown     Past Surgical History:   Procedure Laterality Date    HERNIA REPAIR      1970's    OTHER SURGICAL HISTORY      ohs icrographic Surgery Face ; right upper lip     Social History     Substance and Sexual Activity   Alcohol Use Not Currently    Alcohol/week: 0 0 standard drinks    Comment: social     Social History     Substance and Sexual Activity   Drug Use No     Social History     Tobacco Use   Smoking Status Never Smoker   Smokeless Tobacco Never Used   Tobacco Comment    smoked for 1 year at age 13     Family History   Problem Relation Age of Onset  Heart attack Father     No Known Problems Sister     Coronary artery disease Brother        Allergies:  No Known Allergies    Medications:     Current Outpatient Medications:     Alpha Lipoic Acid 200 MG CAPS, , Disp: , Rfl:     apixaban (Eliquis) 5 mg, Take 1 tablet (5 mg total) by mouth 2 (two) times a day, Disp: 180 tablet, Rfl: 1    Ascorbic Acid (Vitamin C) 500 MG CAPS, , Disp: , Rfl:     B Complex Vitamins (VITAMIN B COMPLEX PO), Take by mouth, Disp: , Rfl:     Coenzyme Q10 (CoQ10) 100 MG CAPS, , Disp: , Rfl:     Multiple Vitamin (DAILY VALUE MULTIVITAMIN) TABS, Take by mouth, Disp: , Rfl:     Omega 3 1200 MG CAPS, , Disp: , Rfl:     Saw Palmetto, Serenoa repens, ([employer] SAW PALMETTO) 450 MG CAPS, Take by mouth, Disp: , Rfl:     SELENIUM PO, 15 mg, Disp: , Rfl:     Sodium Fluoride (PREVIDENT 5000 BOOSTER) 1 1 % PSTE, PreviDent 5000 Booster 1 1 % Dental Paste; 100 00; 0; 21-Apr-2012; 13-Dec-2010; Active, Disp: , Rfl:     Vitamin E 400 units TABS, , Disp: , Rfl:     Zinc 30 MG TABS, , Disp: , Rfl:     oxybutynin (DITROPAN-XL) 10 MG 24 hr tablet, Take by mouth (Patient not taking: Reported on 1/5/2022 ), Disp: , Rfl:       Wt Readings from Last 3 Encounters:   01/05/22 69 4 kg (153 lb)   12/29/21 69 4 kg (153 lb)   11/23/21 69 4 kg (153 lb)     Temp Readings from Last 3 Encounters:   11/19/21 98 3 °F (36 8 °C)   11/19/20 97 8 °F (36 6 °C)   07/14/20 98 °F (36 7 °C) (Temporal)     BP Readings from Last 3 Encounters:   01/05/22 126/68   12/29/21 120/82   11/23/21 120/82     Pulse Readings from Last 3 Encounters:   01/05/22 68   12/29/21 89   11/23/21 68         Physical Exam  HENT:      Head: Atraumatic  Mouth/Throat:      Mouth: Mucous membranes are moist    Eyes:      Extraocular Movements: Extraocular movements intact  Cardiovascular:      Rate and Rhythm: Normal rate  Rhythm irregular  Heart sounds: Normal heart sounds     Pulmonary:      Effort: Pulmonary effort is normal  Breath sounds: Normal breath sounds  Abdominal:      General: Abdomen is flat  Musculoskeletal:         General: Normal range of motion  Cervical back: Normal range of motion  Skin:     General: Skin is warm  Neurological:      General: No focal deficit present  Mental Status: He is alert and oriented to person, place, and time     Psychiatric:         Mood and Affect: Mood normal          Behavior: Behavior normal            Laboratory Studies:  CMP:  Lab Results   Component Value Date     07/29/2015    K 4 3 11/20/2021     11/20/2021    CO2 27 11/20/2021    ANIONGAP 7 07/29/2015    BUN 20 11/20/2021    CREATININE 0 82 11/20/2021    GLUCOSE 88 07/29/2015    AST 18 11/19/2020    ALT 20 11/19/2020    BILITOT 0 49 07/29/2015    EGFR 84 11/20/2021       Lipid Profile:   Lab Results   Component Value Date    CHOL 182 07/29/2015     Lab Results   Component Value Date    HDL 92 11/19/2020     Lab Results   Component Value Date    LDLCALC 91 11/19/2020     Lab Results   Component Value Date    TRIG 43 11/19/2020       Cardiac testing:   EKG reviewed personally: Afib 102 [id number]

## 2022-03-14 DIAGNOSIS — I48.20 CHRONIC ATRIAL FIBRILLATION (HCC): ICD-10-CM

## 2022-04-09 ENCOUNTER — IMMUNIZATIONS (OUTPATIENT)
Dept: FAMILY MEDICINE CLINIC | Facility: HOSPITAL | Age: 82
End: 2022-04-09

## 2022-04-09 PROCEDURE — 0054A COVID-19 PFIZER VACC TRIS-SUCROSE GRAY CAP 0.3 ML: CPT

## 2022-04-09 PROCEDURE — 91305 COVID-19 PFIZER VACC TRIS-SUCROSE GRAY CAP 0.3 ML: CPT

## 2022-04-27 ENCOUNTER — OFFICE VISIT (OUTPATIENT)
Dept: CARDIOLOGY CLINIC | Facility: MEDICAL CENTER | Age: 82
End: 2022-04-27
Payer: COMMERCIAL

## 2022-04-27 VITALS
DIASTOLIC BLOOD PRESSURE: 80 MMHG | HEART RATE: 62 BPM | BODY MASS INDEX: 22.76 KG/M2 | WEIGHT: 159 LBS | SYSTOLIC BLOOD PRESSURE: 128 MMHG | OXYGEN SATURATION: 100 % | HEIGHT: 70 IN

## 2022-04-27 DIAGNOSIS — I48.11 LONGSTANDING PERSISTENT ATRIAL FIBRILLATION (HCC): Primary | ICD-10-CM

## 2022-04-27 DIAGNOSIS — I48.0 PAROXYSMAL ATRIAL FIBRILLATION (HCC): ICD-10-CM

## 2022-04-27 DIAGNOSIS — I34.0 NONRHEUMATIC MITRAL VALVE REGURGITATION: ICD-10-CM

## 2022-04-27 PROCEDURE — 1036F TOBACCO NON-USER: CPT | Performed by: INTERNAL MEDICINE

## 2022-04-27 PROCEDURE — 93000 ELECTROCARDIOGRAM COMPLETE: CPT | Performed by: INTERNAL MEDICINE

## 2022-04-27 PROCEDURE — 1160F RVW MEDS BY RX/DR IN RCRD: CPT | Performed by: INTERNAL MEDICINE

## 2022-04-27 PROCEDURE — 99214 OFFICE O/P EST MOD 30 MIN: CPT | Performed by: INTERNAL MEDICINE

## 2022-04-27 NOTE — PROGRESS NOTES
Cardiology   Franco David [de-identified] y o  male MRN: [id number]          Impression:  1  New onset atrial fibrillation - asymptomatic   Started on Eliquis   Asymptomatic      2  Mitral regurgitation - moderate      Recommendations:  1  Continue current medications  2  Follow up in 6 months  HPI: Franco David is a [de-identified]y o  year old male with paroxysmal atrial fibrillation who presents for follow up  Asymptomatic  No chest pain, shortness of breath, or palpitations  Echo demonstrated normal LV systolic function, mild LVH, and moderate mitral regurgitation  TSH elevated  Review of Systems   Constitutional: Negative  HENT: Negative  Eyes: Negative  Respiratory: Negative for chest tightness and shortness of breath  Cardiovascular: Negative for chest pain, palpitations and leg swelling  Gastrointestinal: Negative  Endocrine: Negative  Genitourinary: Negative  Musculoskeletal: Negative  Skin: Negative  Allergic/Immunologic: Negative  Neurological: Negative  Hematological: Negative  Psychiatric/Behavioral: Negative  All other systems reviewed and are negative          Past Medical History:   Diagnosis Date    Allergic 1956    Basal cell carcinoma     Cancer ([facility] [address] ) unknown    Disease of thyroid gland     Visual impairment unknown     Past Surgical History:   Procedure Laterality Date    HERNIA REPAIR      1970's    OTHER SURGICAL HISTORY      ohs icrographic Surgery Face ; right upper lip     Social History     Substance and Sexual Activity   Alcohol Use Not Currently    Alcohol/week: 0 0 standard drinks    Comment: social     Social History     Substance and Sexual Activity   Drug Use No     Social History     Tobacco Use   Smoking Status Never Smoker   Smokeless Tobacco Never Used   Tobacco Comment    smoked for 1 year at age 13     Family History   Problem Relation Age of Onset    Heart attack Father     No Known Problems Sister     Coronary artery disease Brother        Allergies:  No Known Allergies    Medications:     Current Outpatient Medications:     Alpha Lipoic Acid 200 MG CAPS, , Disp: , Rfl:     apixaban (Eliquis) 5 mg, Take 1 tablet (5 mg total) by mouth 2 (two) times a day, Disp: 180 tablet, Rfl: 3    Ascorbic Acid (Vitamin C) 500 MG CAPS, , Disp: , Rfl:     B Complex Vitamins (VITAMIN B COMPLEX PO), Take by mouth, Disp: , Rfl:     Coenzyme Q10 (CoQ10) 100 MG CAPS, , Disp: , Rfl:     metoprolol succinate (TOPROL-XL) 25 mg 24 hr tablet, Take 1 tablet (25 mg total) by mouth daily, Disp: 90 tablet, Rfl: 3    Multiple Vitamin (DAILY VALUE MULTIVITAMIN) TABS, Take by mouth, Disp: , Rfl:     Omega 3 1200 MG CAPS, , Disp: , Rfl:     Saw Palmetto, Serenoa repens, ([employer] SAW PALMETTO) 450 MG CAPS, Take by mouth, Disp: , Rfl:     SELENIUM PO, 15 mg, Disp: , Rfl:     Sodium Fluoride (PREVIDENT 5000 BOOSTER) 1 1 % PSTE, PreviDent 5000 Booster 1 1 % Dental Paste; 100 00; 0; 21-Apr-2012; 13-Dec-2010; Active, Disp: , Rfl:     Vitamin E 400 units TABS, , Disp: , Rfl:     Zinc 30 MG TABS, , Disp: , Rfl:       Wt Readings from Last 3 Encounters:   04/27/22 72 1 kg (159 lb)   01/05/22 69 4 kg (153 lb)   12/29/21 69 4 kg (153 lb)     Temp Readings from Last 3 Encounters:   11/19/21 98 3 °F (36 8 °C)   11/19/20 97 8 °F (36 6 °C)   07/14/20 98 °F (36 7 °C) (Temporal)     BP Readings from Last 3 Encounters:   04/27/22 128/80   01/05/22 126/68   12/29/21 120/82     Pulse Readings from Last 3 Encounters:   04/27/22 62   01/05/22 68   12/29/21 89         Physical Exam  HENT:      Head: Atraumatic  Mouth/Throat:      Mouth: Mucous membranes are moist    Eyes:      Extraocular Movements: Extraocular movements intact  Cardiovascular:      Rate and Rhythm: Normal rate  Rhythm irregular  Heart sounds: Normal heart sounds  Pulmonary:      Effort: Pulmonary effort is normal       Breath sounds: Normal breath sounds  Abdominal:      General: Abdomen is flat  Musculoskeletal:         General: Normal range of motion  Cervical back: Normal range of motion  Skin:     General: Skin is warm  Neurological:      General: No focal deficit present  Mental Status: He is alert and oriented to person, place, and time     Psychiatric:         Mood and Affect: Mood normal          Behavior: Behavior normal            Laboratory Studies:  CMP:  Lab Results   Component Value Date     07/29/2015    K 4 3 11/20/2021     11/20/2021    CO2 27 11/20/2021    ANIONGAP 7 07/29/2015    BUN 20 11/20/2021    CREATININE 0 82 11/20/2021    GLUCOSE 88 07/29/2015    AST 18 11/19/2020    ALT 20 11/19/2020    BILITOT 0 49 07/29/2015    EGFR 84 11/20/2021       Lipid Profile:   Lab Results   Component Value Date    CHOL 182 07/29/2015     Lab Results   Component Value Date    HDL 92 11/19/2020     Lab Results   Component Value Date    LDLCALC 91 11/19/2020     Lab Results   Component Value Date    TRIG 43 11/19/2020       Cardiac testing:   EKG reviewed personally: Afib 87 [id number]

## 2022-10-24 DIAGNOSIS — I48.20 CHRONIC ATRIAL FIBRILLATION (HCC): ICD-10-CM

## 2022-10-25 ENCOUNTER — IMMUNIZATIONS (OUTPATIENT)
Dept: FAMILY MEDICINE CLINIC | Facility: MEDICAL CENTER | Age: 82
End: 2022-10-25
Payer: COMMERCIAL

## 2022-10-25 DIAGNOSIS — Z23 ENCOUNTER FOR IMMUNIZATION: Primary | ICD-10-CM

## 2022-10-25 PROCEDURE — G0008 ADMIN INFLUENZA VIRUS VAC: HCPCS

## 2022-10-25 PROCEDURE — 90662 IIV NO PRSV INCREASED AG IM: CPT

## 2022-11-17 ENCOUNTER — RA CDI HCC (OUTPATIENT)
Dept: OTHER | Facility: HOSPITAL | Age: 82
End: 2022-11-17

## 2022-11-17 NOTE — PROGRESS NOTES
[facility] [address]  coding opportunities       Chart reviewed, no opportunity found: CHART REVIEWED, NO OPPORTUNITY FOUND        Patients Insurance     Medicare Insurance: Medicare

## 2022-11-21 ENCOUNTER — TELEPHONE (OUTPATIENT)
Dept: CARDIOLOGY CLINIC | Facility: CLINIC | Age: 82
End: 2022-11-21

## 2022-11-21 NOTE — TELEPHONE ENCOUNTER
P/c'd , he wants to know if the Eliquis samples are At [address] [place] [address], or did someone take them to [facility]  He needs them at [facility]      Please advise

## 2022-11-28 ENCOUNTER — APPOINTMENT (OUTPATIENT)
Dept: LAB | Facility: MEDICAL CENTER | Age: 82
End: 2022-11-28

## 2022-11-28 ENCOUNTER — OFFICE VISIT (OUTPATIENT)
Dept: FAMILY MEDICINE CLINIC | Facility: MEDICAL CENTER | Age: 82
End: 2022-11-28

## 2022-11-28 VITALS
HEIGHT: 70 IN | DIASTOLIC BLOOD PRESSURE: 74 MMHG | BODY MASS INDEX: 22.62 KG/M2 | WEIGHT: 158 LBS | SYSTOLIC BLOOD PRESSURE: 128 MMHG | HEART RATE: 84 BPM | RESPIRATION RATE: 16 BRPM

## 2022-11-28 DIAGNOSIS — Z13.29 SCREENING FOR THYROID DISORDER: ICD-10-CM

## 2022-11-28 DIAGNOSIS — Z13.0 SCREENING FOR IRON DEFICIENCY ANEMIA: ICD-10-CM

## 2022-11-28 DIAGNOSIS — Z13.220 SCREENING FOR LIPID DISORDERS: ICD-10-CM

## 2022-11-28 DIAGNOSIS — I48.0 PAROXYSMAL ATRIAL FIBRILLATION (HCC): ICD-10-CM

## 2022-11-28 DIAGNOSIS — Z00.00 PREVENTATIVE HEALTH CARE: Primary | ICD-10-CM

## 2022-11-28 DIAGNOSIS — N39.41 BENIGN PROSTATIC HYPERPLASIA (BPH) WITH URINARY URGE INCONTINENCE: ICD-10-CM

## 2022-11-28 DIAGNOSIS — N40.1 BENIGN PROSTATIC HYPERPLASIA (BPH) WITH URINARY URGE INCONTINENCE: ICD-10-CM

## 2022-11-28 DIAGNOSIS — Z13.1 SCREENING FOR DIABETES MELLITUS: ICD-10-CM

## 2022-11-28 LAB
ALBUMIN SERPL BCP-MCNC: 3.3 G/DL (ref 3.5–5)
ALP SERPL-CCNC: 63 U/L (ref 46–116)
ALT SERPL W P-5'-P-CCNC: 42 U/L (ref 12–78)
ANION GAP SERPL CALCULATED.3IONS-SCNC: 4 MMOL/L (ref 4–13)
AST SERPL W P-5'-P-CCNC: 28 U/L (ref 5–45)
BASOPHILS # BLD AUTO: 0.05 THOUSANDS/ÂΜL (ref 0–0.1)
BASOPHILS NFR BLD AUTO: 1 % (ref 0–1)
BILIRUB SERPL-MCNC: 0.78 MG/DL (ref 0.2–1)
BUN SERPL-MCNC: 18 MG/DL (ref 5–25)
CALCIUM ALBUM COR SERPL-MCNC: 10 MG/DL (ref 8.3–10.1)
CALCIUM SERPL-MCNC: 9.4 MG/DL (ref 8.3–10.1)
CHLORIDE SERPL-SCNC: 106 MMOL/L (ref 96–108)
CHOLEST SERPL-MCNC: 176 MG/DL
CO2 SERPL-SCNC: 27 MMOL/L (ref 21–32)
CREAT SERPL-MCNC: 0.71 MG/DL (ref 0.6–1.3)
EOSINOPHIL # BLD AUTO: 0.21 THOUSAND/ÂΜL (ref 0–0.61)
EOSINOPHIL NFR BLD AUTO: 4 % (ref 0–6)
ERYTHROCYTE [DISTWIDTH] IN BLOOD BY AUTOMATED COUNT: 13.6 % (ref 11.6–15.1)
GFR SERPL CREATININE-BSD FRML MDRD: 87 ML/MIN/1.73SQ M
GLUCOSE P FAST SERPL-MCNC: 94 MG/DL (ref 65–99)
HCT VFR BLD AUTO: 47.9 % (ref 36.5–49.3)
HDLC SERPL-MCNC: 73 MG/DL
HGB BLD-MCNC: 15.2 G/DL (ref 12–17)
IMM GRANULOCYTES # BLD AUTO: 0.02 THOUSAND/UL (ref 0–0.2)
IMM GRANULOCYTES NFR BLD AUTO: 0 % (ref 0–2)
LDLC SERPL CALC-MCNC: 94 MG/DL (ref 0–100)
LYMPHOCYTES # BLD AUTO: 0.81 THOUSANDS/ÂΜL (ref 0.6–4.47)
LYMPHOCYTES NFR BLD AUTO: 16 % (ref 14–44)
MCH RBC QN AUTO: 29.3 PG (ref 26.8–34.3)
MCHC RBC AUTO-ENTMCNC: 31.7 G/DL (ref 31.4–37.4)
MCV RBC AUTO: 93 FL (ref 82–98)
MONOCYTES # BLD AUTO: 0.69 THOUSAND/ÂΜL (ref 0.17–1.22)
MONOCYTES NFR BLD AUTO: 14 % (ref 4–12)
NEUTROPHILS # BLD AUTO: 3.22 THOUSANDS/ÂΜL (ref 1.85–7.62)
NEUTS SEG NFR BLD AUTO: 65 % (ref 43–75)
NRBC BLD AUTO-RTO: 0 /100 WBCS
PLATELET # BLD AUTO: 219 THOUSANDS/UL (ref 149–390)
PMV BLD AUTO: 10.4 FL (ref 8.9–12.7)
POTASSIUM SERPL-SCNC: 4.3 MMOL/L (ref 3.5–5.3)
PROT SERPL-MCNC: 7.3 G/DL (ref 6.4–8.4)
RBC # BLD AUTO: 5.18 MILLION/UL (ref 3.88–5.62)
SODIUM SERPL-SCNC: 137 MMOL/L (ref 135–147)
T4 FREE SERPL-MCNC: 1.01 NG/DL (ref 0.76–1.46)
TRIGL SERPL-MCNC: 45 MG/DL
TSH SERPL DL<=0.05 MIU/L-ACNC: 6.64 UIU/ML (ref 0.45–4.5)
WBC # BLD AUTO: 5 THOUSAND/UL (ref 4.31–10.16)

## 2022-11-28 NOTE — ASSESSMENT & PLAN NOTE
Last year at the annual wellness visit, we found he was in AFib  I started Eliquis and I referred him to Cardiology  He is still following up with Cardiology  Is taking Eliquis and metoprolol

## 2022-11-28 NOTE — ASSESSMENT & PLAN NOTE
Along the way, he stops the tamsulosin  There is no outflow problems, however he does have borderline urge incontinence at night  He is tolerating that pretty well  No meds

## 2022-11-28 NOTE — ASSESSMENT & PLAN NOTE
He has inappropriate TSH syndrome  About four years ago we tried thyroid replacement, however he does not tolerate that  Additionally, in the meantime, he developed AFib therefore we have to have precautions

## 2022-11-28 NOTE — PROGRESS NOTES
Assessment and Plan:     Problem List Items Addressed This Visit        Cardiovascular and Mediastinum    Atrial fibrillation ([address] )     Last year at the annual wellness visit, we found he was in AFib  I started Eliquis and I referred him to Cardiology  He is still following up with Cardiology  Is taking Eliquis and metoprolol  Relevant Orders    Comprehensive metabolic panel       Genitourinary    Benign prostatic hyperplasia (BPH) with urinary urge incontinence     Along the way, he stops the tamsulosin  There is no outflow problems, however he does have borderline urge incontinence at night  He is tolerating that pretty well  No meds  Other Visit Diagnoses     Preventative health care    -  Primary    Screening for lipid disorders        Relevant Orders    Lipid Panel with Direct LDL reflex    Screening for thyroid disorder        Relevant Orders    TSH, 3rd generation with Free T4 reflex    Screening for diabetes mellitus        Screening for iron deficiency anemia        Relevant Orders    CBC and differential          Depression Screening and Follow-up Plan: Patient was screened for depression during today's encounter  They screened negative with a PHQ-2 score of 0  Preventive health issues were discussed with patient, and age appropriate screening tests were ordered as noted in patient's After Visit Summary  Personalized health advice and appropriate referrals for health education or preventive services given if needed, as noted in patient's After Visit Summary  History of Present Illness:     Patient presents for a Medicare Wellness Visit    This is a delightful 73-year-old man who is  and lives with his wife  He is retired and he enjoys traveling and knot tying  He has aged out of cancer screening       Patient Care Team:  Rodger Benson MD as PCP - MD Lucas King MD Kennard Lu, MD     Review of Systems:     Review of Systems Constitutional: Negative for activity change, fatigue and fever  HENT: Negative for congestion, ear discharge, ear pain (He has bilateral ear itching  Secondary to cerumen impaction ), postnasal drip, rhinorrhea, sinus pain, sneezing and sore throat  Eyes: Negative for photophobia, pain, discharge and redness  Respiratory: Negative for apnea, cough, shortness of breath and wheezing  Cardiovascular: Negative for chest pain and palpitations  Gastrointestinal: Negative for abdominal pain, blood in stool, constipation, diarrhea, nausea and vomiting  Endocrine: Negative for polydipsia, polyphagia and polyuria  Genitourinary: Negative for decreased urine volume, difficulty urinating, dysuria, frequency, penile discharge, penile pain and urgency  Musculoskeletal: Negative for arthralgias, gait problem, joint swelling and neck pain  Skin: Negative for color change and rash  Neurological: Negative for dizziness, tremors, seizures, weakness and headaches  Psychiatric/Behavioral: Negative for agitation and sleep disturbance  The patient is not nervous/anxious           Problem List:     Patient Active Problem List   Diagnosis   • Benign prostatic hyperplasia (BPH) with urinary urge incontinence   • Acquired hypothyroidism   • Allergic rhinitis due to pollen   • Elevated HDL   • Skin lesion of neck   • History of basal cell carcinoma   • Atrial fibrillation (HCC)   • Nonrheumatic mitral valve regurgitation      Past Medical and Surgical History:     Past Medical History:   Diagnosis Date   • Allergic 1956   • Basal cell carcinoma    • Cancer ([facility] [address] ) unknown   • Disease of thyroid gland    • Visual impairment unknown     Past Surgical History:   Procedure Laterality Date   • HERNIA REPAIR      1970's   • OTHER SURGICAL HISTORY      ohs icrographic Surgery Face ; right upper lip      Family History:     Family History   Problem Relation Age of Onset   • Heart attack Father    • No Known Problems Sister • Coronary artery disease Brother       Social History:     Social History     Socioeconomic History   • Marital status: /Civil Union     Spouse name: None   • Number of children: None   • Years of education: None   • Highest education level: None   Occupational History   • None   Tobacco Use   • Smoking status: Never   • Smokeless tobacco: Never   • Tobacco comments:     smoked for 1 year at age 13   Substance and Sexual Activity   • Alcohol use: Not Currently     Alcohol/week: 0 0 standard drinks     Comment: social   • Drug use: No   • Sexual activity: Not Currently   Other Topics Concern   • None   Social History Narrative    Caffeine use     Social Determinants of Health     Financial Resource Strain: Low Risk    • Difficulty of Paying Living Expenses: Not very hard   Food Insecurity: Not on file   Transportation Needs: No Transportation Needs   • Lack of Transportation (Medical): No   • Lack of Transportation (Non-Medical):  No   Physical Activity: Not on file   Stress: Not on file   Social Connections: Not on file   Intimate Partner Violence: Not on file   Housing Stability: Not on file      Medications and Allergies:     Current Outpatient Medications   Medication Sig Dispense Refill   • Alpha Lipoic Acid 200 MG CAPS      • apixaban (Eliquis) 5 mg Take 1 tablet (5 mg total) by mouth 2 (two) times a day Lot [id number] exp 9/24  56 tablet 0   • Ascorbic Acid (Vitamin C) 500 MG CAPS      • B Complex Vitamins (VITAMIN B COMPLEX PO) Take by mouth     • Coenzyme Q10 (CoQ10) 100 MG CAPS      • metoprolol succinate (TOPROL-XL) 25 mg 24 hr tablet Take 1 tablet (25 mg total) by mouth daily 90 tablet 3   • Multiple Vitamin (DAILY VALUE MULTIVITAMIN) TABS Take by mouth     • Omega 3 1200 MG CAPS      • Saw Palmetto, Serenoa repens, ([facility] SAW PALMETTO) 450 MG CAPS Take by mouth     • SELENIUM PO 15 mg     • Sodium Fluoride (PREVIDENT 5000 BOOSTER) 1 1 % PSTE PreviDent 5000 Booster 1 1 % Dental Paste; 100 00; 0; 21-Apr-2012; 13-Dec-2010; Active     • Vitamin E 400 units TABS      • Zinc 30 MG TABS        No current facility-administered medications for this visit  No Known Allergies   Immunizations:     Immunization History   Administered Date(s) Administered   • COVID-19 PFIZER VACCINE 0 3 ML IM 02/13/2021, 03/08/2021, 09/25/2021   • COVID-19 Pfizer vac (Juan-sucrose, gray cap) 12 yr+ IM 04/09/2022   • INFLUENZA 10/25/2022   • Influenza Split High Dose Preservative Free IM 09/30/2015, 10/20/2016, 10/24/2017   • Influenza, high dose seasonal 0 7 mL 11/05/2018, 11/11/2019, 10/07/2020, 10/20/2021, 10/25/2022   • Pneumococcal Conjugate 13-Valent 08/28/2017   • Pneumococcal Polysaccharide PPV23 12/15/2008   • Tdap 06/13/2012      Health Maintenance: There are no preventive care reminders to display for this patient  Topic Date Due   • Hepatitis B Vaccine (1 of 3 - 3-dose series) Never done      Medicare Screening Tests and Risk Assessments:     Vinod Vasques is here for his Subsequent Wellness visit  Health Risk Assessment:   Patient rates overall health as very good  Patient feels that their physical health rating is same  Patient is satisfied with their life  Eyesight was rated as same  Hearing was rated as same  Patient feels that their emotional and mental health rating is same  Patients states they are never, rarely angry  Patient states they are sometimes unusually tired/fatigued  Pain experienced in the last 7 days has been none  Patient states that he has experienced no weight loss or gain in last 6 months  Fall Risk Screening: In the past year, patient has experienced: no history of falling in past year      Home Safety:  Patient does not have trouble with stairs inside or outside of their home  Patient has working smoke alarms and has working carbon monoxide detector  Home safety hazards include: household clutter  Nutrition:   Current diet is Regular, No Added Salt and Other (please comment)   a lot of restaurant left-overs    Medications:   Patient is currently taking over-the-counter supplements  OTC medications include: already listed  Patient is able to manage medications  Activities of Daily Living (ADLs)/Instrumental Activities of Daily Living (IADLs):   Walk and transfer into and out of bed and chair?: Yes  Dress and groom yourself?: Yes    Bathe or shower yourself?: Yes    Feed yourself? Yes  Do your laundry/housekeeping?: Yes  Manage your money, pay your bills and track your expenses?: Yes  Make your own meals?: Yes    Do your own shopping?: Yes    Previous Hospitalizations:   Any hospitalizations or ED visits within the last 12 months?: No      Advance Care Planning:   Living will: Yes    Durable POA for healthcare: Yes    Advanced directive: Yes      PREVENTIVE SCREENINGS      Cardiovascular Screening:    General: Screening Current      Prostate Cancer Screening:    General: Screening Not Indicated      Lung Cancer Screening:     General: Screening Not Indicated    Screening, Brief Intervention, and Referral to Treatment (SBIRT)    Screening  Typical number of drinks in a day: 0  Typical number of drinks in a week: 0  Interpretation: Low risk drinking behavior  AUDIT-C Screenin) How often did you have a drink containing alcohol in the past year? never  2) How many drinks did you have on a typical day when you were drinking in the past year? 0  3) How often did you have 6 or more drinks on one occasion in the past year? never    AUDIT-C Score: 0  Interpretation: Score 0-3 (male): Negative screen for alcohol misuse    Single Item Drug Screening:  How often have you used an illegal drug (including marijuana) or a prescription medication for non-medical reasons in the past year? never    Single Item Drug Screen Score: 0  Interpretation: Negative screen for possible drug use disorder    No results found       Physical Exam:     /74 (BP Location: Left arm, Patient Position: Sitting, Cuff Size: Adult)   Pulse 84   Resp 16   Ht 5' 10" (1 778 m)   Wt 71 7 kg (158 lb)   BMI 22 67 kg/m²     Physical Exam  Vitals and nursing note reviewed  Constitutional:       General: He is not in acute distress  Appearance: Normal appearance  He is well-developed  He is not ill-appearing  HENT:      Head: Normocephalic  Right Ear: Tympanic membrane, ear canal and external ear normal  There is impacted cerumen  Left Ear: Tympanic membrane, ear canal and external ear normal  There is impacted cerumen (They were irrigated and resolved the impaction  )  Nose: Nose normal  No rhinorrhea  Mouth/Throat:      Mouth: Mucous membranes are moist       Pharynx: Uvula midline  No oropharyngeal exudate  Tonsils: No tonsillar exudate  Eyes:      General: Lids are normal       Conjunctiva/sclera: Conjunctivae normal       Pupils: Pupils are equal, round, and reactive to light  Neck:      Thyroid: No thyromegaly  Vascular: No carotid bruit  Trachea: Trachea normal    Cardiovascular:      Rate and Rhythm: Normal rate and regular rhythm  Pulses: Normal pulses  Heart sounds: Normal heart sounds, S1 normal and S2 normal  No murmur heard  Pulmonary:      Effort: Pulmonary effort is normal  No respiratory distress  Breath sounds: Normal breath sounds  Abdominal:      General: Bowel sounds are normal       Palpations: Abdomen is soft  Tenderness: There is no abdominal tenderness  Hernia: No hernia is present  Musculoskeletal:         General: Normal range of motion  Cervical back: Normal range of motion and neck supple  Lymphadenopathy:      Cervical: No cervical adenopathy  Skin:     General: Skin is warm and dry  Neurological:      General: No focal deficit present  Mental Status: He is alert and oriented to person, place, and time  Cranial Nerves: No cranial nerve deficit  Sensory: No sensory deficit        Gait: Gait normal  Deep Tendon Reflexes: Reflexes are normal and symmetric  Psychiatric:         Speech: Speech normal          Behavior: Behavior normal  Behavior is cooperative  Thought Content:  Thought content normal           Karime Adams MD

## 2022-12-20 ENCOUNTER — OFFICE VISIT (OUTPATIENT)
Dept: CARDIOLOGY CLINIC | Facility: MEDICAL CENTER | Age: 82
End: 2022-12-20

## 2022-12-20 VITALS
HEIGHT: 70 IN | OXYGEN SATURATION: 96 % | DIASTOLIC BLOOD PRESSURE: 70 MMHG | BODY MASS INDEX: 22.62 KG/M2 | WEIGHT: 158 LBS | HEART RATE: 65 BPM | SYSTOLIC BLOOD PRESSURE: 124 MMHG

## 2022-12-20 DIAGNOSIS — I34.0 NONRHEUMATIC MITRAL VALVE REGURGITATION: ICD-10-CM

## 2022-12-20 DIAGNOSIS — I48.0 PAROXYSMAL ATRIAL FIBRILLATION (HCC): Primary | ICD-10-CM

## 2022-12-20 DIAGNOSIS — I48.91 ATRIAL FIBRILLATION, UNSPECIFIED TYPE (HCC): ICD-10-CM

## 2022-12-20 RX ORDER — METOPROLOL SUCCINATE 25 MG/1
25 TABLET, EXTENDED RELEASE ORAL DAILY
Qty: 90 TABLET | Refills: 3 | Status: SHIPPED | OUTPATIENT
Start: 2022-12-20

## 2022-12-20 NOTE — PROGRESS NOTES
Cardiology   Sierra Pizarro 81 y o  male MRN: [id number]         Impression:  1  New onset atrial fibrillation - asymptomatic   Started on Eliquis   Asymptomatic      2  Mitral regurgitation - moderate      Recommendations:  1  Continue current medications  2  Follow up in 6 months  HPI: Sierra Pizarro is a 81y o  year old male with paroxysmal atrial fibrillation who presents for follow up  Asymptomatic   No chest pain, shortness of breath, or palpitations   Echo demonstrated normal LV systolic function, mild LVH, and moderate mitral regurgitation   TSH elevated           Review of Systems   Constitutional: Negative  HENT: Negative  Eyes: Negative  Respiratory: Negative for chest tightness and shortness of breath  Cardiovascular: Negative for chest pain, palpitations and leg swelling  Gastrointestinal: Negative  Endocrine: Negative  Genitourinary: Negative  Musculoskeletal: Negative  Skin: Negative  Allergic/Immunologic: Negative  Neurological: Negative  Hematological: Negative  Psychiatric/Behavioral: Negative  All other systems reviewed and are negative          Past Medical History:   Diagnosis Date   • Allergic 1956   • Basal cell carcinoma    • Cancer (HCC) unknown   • Disease of thyroid gland    • Visual impairment unknown     Past Surgical History:   Procedure Laterality Date   • HERNIA REPAIR      1970's   • OTHER SURGICAL HISTORY      ohs icrographic Surgery Face ; right upper lip     Social History     Substance and Sexual Activity   Alcohol Use Not Currently   • Alcohol/week: 0 0 standard drinks    Comment: social     Social History     Substance and Sexual Activity   Drug Use No     Social History     Tobacco Use   Smoking Status Never   Smokeless Tobacco Never   Tobacco Comments    smoked for 1 year at age 13     Family History   Problem Relation Age of Onset   • Heart attack Father    • No Known Problems Sister    • Coronary artery disease Brother Allergies:  No Known Allergies    Medications:     Current Outpatient Medications:   •  Alpha Lipoic Acid 200 MG CAPS, , Disp: , Rfl:   •  apixaban (Eliquis) 5 mg, Take 1 tablet (5 mg total) by mouth 2 (two) times a day Lot [id number] exp 9/24 , Disp: 56 tablet, Rfl: 0  •  Ascorbic Acid (Vitamin C) 500 MG CAPS, , Disp: , Rfl:   •  B Complex Vitamins (VITAMIN B COMPLEX PO), Take by mouth, Disp: , Rfl:   •  Coenzyme Q10 (CoQ10) 100 MG CAPS, , Disp: , Rfl:   •  metoprolol succinate (TOPROL-XL) 25 mg 24 hr tablet, Take 1 tablet (25 mg total) by mouth daily, Disp: 90 tablet, Rfl: 3  •  Multiple Vitamin (DAILY VALUE MULTIVITAMIN) TABS, Take by mouth, Disp: , Rfl:   •  Omega 3 1200 MG CAPS, , Disp: , Rfl:   •  Saw Palmetto, Serenoa repens, 450 MG CAPS, Take by mouth, Disp: , Rfl:   •  SELENIUM PO, 15 mg, Disp: , Rfl:   •  Sodium Fluoride 1 1 % PSTE, PreviDent 5000 Booster 1 1 % Dental Paste; 100 00; 0; 21-Apr-2012; 13-Dec-2010; Active, Disp: , Rfl:   •  Vitamin E 400 units TABS, , Disp: , Rfl:   •  Zinc 30 MG TABS, , Disp: , Rfl:       Wt Readings from Last 3 Encounters:   12/20/22 71 7 kg (158 lb)   11/28/22 71 7 kg (158 lb)   04/27/22 72 1 kg (159 lb)     Temp Readings from Last 3 Encounters:   11/19/21 98 3 °F (36 8 °C)   11/19/20 97 8 °F (36 6 °C)   07/14/20 98 °F (36 7 °C) (Temporal)     BP Readings from Last 3 Encounters:   12/20/22 124/70   11/28/22 128/74   04/27/22 128/80     Pulse Readings from Last 3 Encounters:   12/20/22 65   11/28/22 84   04/27/22 62         Physical Exam  HENT:      Head: Atraumatic  Mouth/Throat:      Mouth: Mucous membranes are moist    Eyes:      Extraocular Movements: Extraocular movements intact  Cardiovascular:      Rate and Rhythm: Normal rate  Rhythm irregular  Heart sounds: Normal heart sounds  Pulmonary:      Effort: Pulmonary effort is normal       Breath sounds: Normal breath sounds  Abdominal:      General: Abdomen is flat     Musculoskeletal:         General: Normal range of motion  Cervical back: Normal range of motion  Skin:     General: Skin is warm  Neurological:      General: No focal deficit present  Mental Status: He is alert and oriented to person, place, and time     Psychiatric:         Mood and Affect: Mood normal          Behavior: Behavior normal            Laboratory Studies:  CMP:  Lab Results   Component Value Date     07/29/2015    K 4 3 11/28/2022     11/28/2022    CO2 27 11/28/2022    ANIONGAP 7 07/29/2015    BUN 18 11/28/2022    CREATININE 0 71 11/28/2022    GLUCOSE 88 07/29/2015    AST 28 11/28/2022    ALT 42 11/28/2022    BILITOT 0 49 07/29/2015    EGFR 87 11/28/2022       Lipid Profile:   Lab Results   Component Value Date    CHOL 182 07/29/2015     Lab Results   Component Value Date    HDL 73 11/28/2022     Lab Results   Component Value Date    LDLCALC 94 11/28/2022     Lab Results   Component Value Date    TRIG 45 11/28/2022       Cardiac testing:   EKG reviewed personally: Afib 96

## 2023-01-02 DIAGNOSIS — I48.20 CHRONIC ATRIAL FIBRILLATION (HCC): ICD-10-CM

## 2023-01-02 NOTE — TELEPHONE ENCOUNTER
Medication Refill Request     Name  apixaban (Eliquis)  Dose/Frequency 5mg/ Take 1 tablet (5 mg total) by mouth 2 (two) times a day  Quantity 56 tablet  Verified pharmacy   [x]  Verified ordering Provider   [x]  Does patient have enough for the next 3 days?  Yes [] No [x]

## 2023-01-03 DIAGNOSIS — I48.20 CHRONIC ATRIAL FIBRILLATION (HCC): ICD-10-CM

## 2023-06-07 ENCOUNTER — OFFICE VISIT (OUTPATIENT)
Dept: CARDIOLOGY CLINIC | Facility: MEDICAL CENTER | Age: 83
End: 2023-06-07
Payer: COMMERCIAL

## 2023-06-07 VITALS
HEIGHT: 70 IN | SYSTOLIC BLOOD PRESSURE: 120 MMHG | DIASTOLIC BLOOD PRESSURE: 60 MMHG | BODY MASS INDEX: 23.34 KG/M2 | HEART RATE: 91 BPM | OXYGEN SATURATION: 98 % | WEIGHT: 163 LBS

## 2023-06-07 DIAGNOSIS — I48.19 PERSISTENT ATRIAL FIBRILLATION (HCC): Primary | ICD-10-CM

## 2023-06-07 DIAGNOSIS — I34.0 NONRHEUMATIC MITRAL VALVE REGURGITATION: ICD-10-CM

## 2023-06-07 PROCEDURE — 93000 ELECTROCARDIOGRAM COMPLETE: CPT | Performed by: INTERNAL MEDICINE

## 2023-06-07 PROCEDURE — 99214 OFFICE O/P EST MOD 30 MIN: CPT | Performed by: INTERNAL MEDICINE

## 2023-06-07 NOTE — PROGRESS NOTES
Cardiology   Marlyne Jeans 81 y o  male MRN: [id number]        Impression:  1  Persistent atrial fibrillation - asymptomatic   Started on Eliquis   Asymptomatic      2  Mitral regurgitation - moderate      Recommendations:  1  Continue current medications  2  Check echo to evaluate progression of mitral regurgitation  3  Follow up in 6 months  HPI: Marlyne Jeans is a 81y o  year old male with paroxysmal atrial fibrillation who presents for follow up  Asymptomatic   No chest pain, shortness of breath, or palpitations  [facility] 12/21 demonstrated normal LV systolic function, mild LVH, and moderate mitral regurgitation            Review of Systems   Constitutional: Negative  HENT: Negative  Eyes: Negative  Respiratory: Negative for chest tightness and shortness of breath  Cardiovascular: Negative for chest pain, palpitations and leg swelling  Gastrointestinal: Negative  Endocrine: Negative  Genitourinary: Negative  Musculoskeletal: Negative  Skin: Negative  Allergic/Immunologic: Negative  Neurological: Negative  Hematological: Negative  Psychiatric/Behavioral: Negative  All other systems reviewed and are negative          Past Medical History:   Diagnosis Date   • Allergic 1956   • Basal cell carcinoma    • Cancer ([facility] [address] ) unknown   • Disease of thyroid gland    • Visual impairment unknown     Past Surgical History:   Procedure Laterality Date   • HERNIA REPAIR      1970's   • OTHER SURGICAL HISTORY      ohs icrographic Surgery Face ; right upper lip     Social History     Substance and Sexual Activity   Alcohol Use Not Currently   • Alcohol/week: 0 0 standard drinks of alcohol    Comment: social     Social History     Substance and Sexual Activity   Drug Use No     Social History     Tobacco Use   Smoking Status Never   Smokeless Tobacco Never   Tobacco Comments    smoked for 1 year at age 13     Family History   Problem Relation Age of Onset   • Heart attack Father    • No Known Problems Sister    • Coronary artery disease Brother        Allergies:  No Known Allergies    Medications:     Current Outpatient Medications:   •  Alpha Lipoic Acid 200 MG CAPS, , Disp: , Rfl:   •  apixaban (Eliquis) 5 mg, Take 1 tablet (5 mg total) by mouth 2 (two) times a day Lot [id number] exp 9/24 , Disp: 180 tablet, Rfl: 3  •  Ascorbic Acid (Vitamin C) 500 MG CAPS, , Disp: , Rfl:   •  B Complex Vitamins (VITAMIN B COMPLEX PO), Take by mouth, Disp: , Rfl:   •  Coenzyme Q10 (CoQ10) 100 MG CAPS, , Disp: , Rfl:   •  metoprolol succinate (TOPROL-XL) 25 mg 24 hr tablet, Take 1 tablet (25 mg total) by mouth daily, Disp: 90 tablet, Rfl: 3  •  Multiple Vitamin (DAILY VALUE MULTIVITAMIN) TABS, Take by mouth, Disp: , Rfl:   •  Omega 3 1200 MG CAPS, , Disp: , Rfl:   •  Saw Palmetto, Serenoa repens, 450 MG CAPS, Take by mouth, Disp: , Rfl:   •  SELENIUM PO, 15 mg, Disp: , Rfl:   •  Sodium Fluoride 1 1 % PSTE, PreviDent 5000 Booster 1 1 % Dental Paste; 100 00; 0; 21-Apr-2012; 13-Dec-2010; Active, Disp: , Rfl:   •  Vitamin E 400 units TABS, , Disp: , Rfl:   •  Zinc 30 MG TABS, , Disp: , Rfl:       Wt Readings from Last 3 Encounters:   06/07/23 73 9 kg (163 lb)   12/20/22 71 7 kg (158 lb)   11/28/22 71 7 kg (158 lb)     Temp Readings from Last 3 Encounters:   11/19/21 98 3 °F (36 8 °C)   11/19/20 97 8 °F (36 6 °C)   07/14/20 98 °F (36 7 °C) (Temporal)     BP Readings from Last 3 Encounters:   06/07/23 120/60   12/20/22 124/70   11/28/22 128/74     Pulse Readings from Last 3 Encounters:   06/07/23 91   12/20/22 65   11/28/22 84         Physical Exam  HENT:      Head: Atraumatic  Mouth/Throat:      Mouth: Mucous membranes are moist    Eyes:      Extraocular Movements: Extraocular movements intact  Cardiovascular:      Rate and Rhythm: Normal rate  Rhythm irregularly irregular  Heart sounds: Normal heart sounds  Pulmonary:      Effort: Pulmonary effort is normal       Breath sounds: Normal breath sounds  Abdominal:      General: Abdomen is flat  Musculoskeletal:         General: Normal range of motion  Cervical back: Normal range of motion  Skin:     General: Skin is warm  Neurological:      General: No focal deficit present  Mental Status: He is alert and oriented to person, place, and time     Psychiatric:         Mood and Affect: Mood normal          Behavior: Behavior normal            Laboratory Studies:  CMP:  Lab Results   Component Value Date    ALT 42 11/28/2022    ANIONGAP 7 07/29/2015    AST 28 11/28/2022    BILITOT 0 49 07/29/2015    BUN 18 11/28/2022     11/28/2022    CO2 27 11/28/2022    CREATININE 0 71 11/28/2022    EGFR 87 11/28/2022    GLUCOSE 88 07/29/2015    K 4 3 11/28/2022     07/29/2015       Lipid Profile:   Lab Results   Component Value Date    CHOL 182 07/29/2015     Lab Results   Component Value Date    HDL 73 11/28/2022     Lab Results   Component Value Date    LDLCALC 94 11/28/2022     Lab Results   Component Value Date    TRIG 45 11/28/2022       Cardiac testing:   EKG reviewed personally: Afib 77 RBBB

## 2023-06-07 NOTE — PATIENT INSTRUCTIONS
Recommendations:  1  Continue current medications  2  Check echo to evaluate progression of mitral regurgitation  3  Follow up in 6 months

## 2023-07-13 ENCOUNTER — HOSPITAL ENCOUNTER (OUTPATIENT)
Dept: NON INVASIVE DIAGNOSTICS | Facility: MEDICAL CENTER | Age: 83
Discharge: HOME | End: 2023-07-13
Payer: COMMERCIAL

## 2023-07-13 VITALS
SYSTOLIC BLOOD PRESSURE: 120 MMHG | WEIGHT: 163 LBS | HEIGHT: 70 IN | BODY MASS INDEX: 23.34 KG/M2 | DIASTOLIC BLOOD PRESSURE: 60 MMHG | HEART RATE: 90 BPM

## 2023-07-13 DIAGNOSIS — I48.19 PERSISTENT ATRIAL FIBRILLATION (HCC): ICD-10-CM

## 2023-07-13 LAB
AORTIC ROOT: 3.7 CM
APICAL FOUR CHAMBER EJECTION FRACTION: 51 %
ASCENDING AORTA: 3.4 CM
FRACTIONAL SHORTENING: 43 % (ref 28–44)
INTERVENTRICULAR SEPTUM IN DIASTOLE (PARASTERNAL SHORT AXIS VIEW): 1.2 CM
INTERVENTRICULAR SEPTUM: 1.2 CM (ref 0.6–1.1)
LAAS-AP2: 27.9 CM2
LAAS-AP4: 29.1 CM2
LEFT ATRIUM SIZE: 4.2 CM
LEFT ATRIUM VOLUME (MOD BIPLANE): 107 ML
LEFT INTERNAL DIMENSION IN SYSTOLE: 2.5 CM (ref 2.1–4)
LEFT VENTRICULAR INTERNAL DIMENSION IN DIASTOLE: 4.4 CM (ref 3.5–6)
LEFT VENTRICULAR POSTERIOR WALL IN END DIASTOLE: 1.2 CM
LEFT VENTRICULAR STROKE VOLUME: 65 ML
LVSV (TEICH): 65 ML
PA SYSTOLIC PRESSURE: 25 MMHG
RIGHT ATRIUM AREA SYSTOLE A4C: 23.2 CM2
RIGHT VENTRICLE ID DIMENSION: 3.6 CM
SL CV LEFT ATRIUM LENGTH A2C: 6.9 CM
SL CV LV EF: 65
SL CV PED ECHO LEFT VENTRICLE DIASTOLIC VOLUME (MOD BIPLANE) 2D: 87 ML
SL CV PED ECHO LEFT VENTRICLE SYSTOLIC VOLUME (MOD BIPLANE) 2D: 22 ML
TR MAX PG: 19 MMHG
TR PEAK VELOCITY: 2.2 M/S
TRICUSPID ANNULAR PLANE SYSTOLIC EXCURSION: 2 CM
TRICUSPID VALVE PEAK REGURGITATION VELOCITY: 2.16 M/S

## 2023-07-13 PROCEDURE — 93306 TTE W/DOPPLER COMPLETE: CPT

## 2023-07-13 PROCEDURE — 93306 TTE W/DOPPLER COMPLETE: CPT | Performed by: INTERNAL MEDICINE

## 2023-07-24 ENCOUNTER — TELEPHONE (OUTPATIENT)
Dept: CARDIOLOGY CLINIC | Facility: CLINIC | Age: 83
End: 2023-07-24

## 2023-11-01 ENCOUNTER — IMMUNIZATIONS (OUTPATIENT)
Dept: FAMILY MEDICINE CLINIC | Facility: MEDICAL CENTER | Age: 83
End: 2023-11-01
Payer: COMMERCIAL

## 2023-11-01 DIAGNOSIS — Z23 ENCOUNTER FOR IMMUNIZATION: Primary | ICD-10-CM

## 2023-11-01 PROCEDURE — G0008 ADMIN INFLUENZA VIRUS VAC: HCPCS

## 2023-11-01 PROCEDURE — 90662 IIV NO PRSV INCREASED AG IM: CPT

## 2023-12-04 ENCOUNTER — OFFICE VISIT (OUTPATIENT)
Dept: FAMILY MEDICINE CLINIC | Facility: MEDICAL CENTER | Age: 83
End: 2023-12-04
Payer: COMMERCIAL

## 2023-12-04 ENCOUNTER — APPOINTMENT (OUTPATIENT)
Dept: LAB | Facility: MEDICAL CENTER | Age: 83
End: 2023-12-04
Payer: COMMERCIAL

## 2023-12-04 VITALS
BODY MASS INDEX: 22.46 KG/M2 | DIASTOLIC BLOOD PRESSURE: 74 MMHG | SYSTOLIC BLOOD PRESSURE: 128 MMHG | WEIGHT: 156.9 LBS | RESPIRATION RATE: 18 BRPM | HEIGHT: 70 IN | HEART RATE: 94 BPM

## 2023-12-04 DIAGNOSIS — Z13.0 SCREENING FOR IRON DEFICIENCY ANEMIA: ICD-10-CM

## 2023-12-04 DIAGNOSIS — I48.19 PERSISTENT ATRIAL FIBRILLATION (HCC): ICD-10-CM

## 2023-12-04 DIAGNOSIS — Z00.00 PREVENTATIVE HEALTH CARE: Primary | ICD-10-CM

## 2023-12-04 DIAGNOSIS — Z13.220 SCREENING FOR LIPID DISORDERS: ICD-10-CM

## 2023-12-04 DIAGNOSIS — Z13.1 SCREENING FOR DIABETES MELLITUS: ICD-10-CM

## 2023-12-04 DIAGNOSIS — E03.9 ACQUIRED HYPOTHYROIDISM: ICD-10-CM

## 2023-12-04 DIAGNOSIS — I34.0 NONRHEUMATIC MITRAL VALVE REGURGITATION: ICD-10-CM

## 2023-12-04 LAB
ALBUMIN SERPL BCP-MCNC: 3.9 G/DL (ref 3.5–5)
ALP SERPL-CCNC: 43 U/L (ref 34–104)
ALT SERPL W P-5'-P-CCNC: 23 U/L (ref 7–52)
ANION GAP SERPL CALCULATED.3IONS-SCNC: 7 MMOL/L
AST SERPL W P-5'-P-CCNC: 22 U/L (ref 13–39)
BASOPHILS # BLD AUTO: 0.06 THOUSANDS/ÂΜL (ref 0–0.1)
BASOPHILS NFR BLD AUTO: 1 % (ref 0–1)
BILIRUB SERPL-MCNC: 0.72 MG/DL (ref 0.2–1)
BUN SERPL-MCNC: 23 MG/DL (ref 5–25)
CALCIUM SERPL-MCNC: 9 MG/DL (ref 8.4–10.2)
CHLORIDE SERPL-SCNC: 105 MMOL/L (ref 96–108)
CHOLEST SERPL-MCNC: 196 MG/DL
CO2 SERPL-SCNC: 29 MMOL/L (ref 21–32)
CREAT SERPL-MCNC: 0.68 MG/DL (ref 0.6–1.3)
EOSINOPHIL # BLD AUTO: 0.19 THOUSAND/ÂΜL (ref 0–0.61)
EOSINOPHIL NFR BLD AUTO: 4 % (ref 0–6)
ERYTHROCYTE [DISTWIDTH] IN BLOOD BY AUTOMATED COUNT: 13.6 % (ref 11.6–15.1)
GFR SERPL CREATININE-BSD FRML MDRD: 88 ML/MIN/1.73SQ M
GLUCOSE P FAST SERPL-MCNC: 86 MG/DL (ref 65–99)
HCT VFR BLD AUTO: 46.7 % (ref 36.5–49.3)
HDLC SERPL-MCNC: 73 MG/DL
HGB BLD-MCNC: 14.8 G/DL (ref 12–17)
IMM GRANULOCYTES # BLD AUTO: 0.02 THOUSAND/UL (ref 0–0.2)
IMM GRANULOCYTES NFR BLD AUTO: 0 % (ref 0–2)
LDLC SERPL CALC-MCNC: 112 MG/DL (ref 0–100)
LYMPHOCYTES # BLD AUTO: 0.74 THOUSANDS/ÂΜL (ref 0.6–4.47)
LYMPHOCYTES NFR BLD AUTO: 16 % (ref 14–44)
MCH RBC QN AUTO: 29.5 PG (ref 26.8–34.3)
MCHC RBC AUTO-ENTMCNC: 31.7 G/DL (ref 31.4–37.4)
MCV RBC AUTO: 93 FL (ref 82–98)
MONOCYTES # BLD AUTO: 0.59 THOUSAND/ÂΜL (ref 0.17–1.22)
MONOCYTES NFR BLD AUTO: 13 % (ref 4–12)
NEUTROPHILS # BLD AUTO: 3 THOUSANDS/ÂΜL (ref 1.85–7.62)
NEUTS SEG NFR BLD AUTO: 66 % (ref 43–75)
NRBC BLD AUTO-RTO: 0 /100 WBCS
PLATELET # BLD AUTO: 220 THOUSANDS/UL (ref 149–390)
PMV BLD AUTO: 10.6 FL (ref 8.9–12.7)
POTASSIUM SERPL-SCNC: 4.3 MMOL/L (ref 3.5–5.3)
PROT SERPL-MCNC: 6.3 G/DL (ref 6.4–8.4)
RBC # BLD AUTO: 5.02 MILLION/UL (ref 3.88–5.62)
SODIUM SERPL-SCNC: 141 MMOL/L (ref 135–147)
T4 FREE SERPL-MCNC: 0.98 NG/DL (ref 0.61–1.12)
TRIGL SERPL-MCNC: 54 MG/DL
TSH SERPL DL<=0.05 MIU/L-ACNC: 7.12 UIU/ML (ref 0.45–4.5)
WBC # BLD AUTO: 4.6 THOUSAND/UL (ref 4.31–10.16)

## 2023-12-04 PROCEDURE — 80061 LIPID PANEL: CPT

## 2023-12-04 PROCEDURE — G0439 PPPS, SUBSEQ VISIT: HCPCS | Performed by: FAMILY MEDICINE

## 2023-12-04 PROCEDURE — 85025 COMPLETE CBC W/AUTO DIFF WBC: CPT

## 2023-12-04 PROCEDURE — 84443 ASSAY THYROID STIM HORMONE: CPT

## 2023-12-04 PROCEDURE — 99213 OFFICE O/P EST LOW 20 MIN: CPT | Performed by: FAMILY MEDICINE

## 2023-12-04 PROCEDURE — 80053 COMPREHEN METABOLIC PANEL: CPT

## 2023-12-04 PROCEDURE — 84439 ASSAY OF FREE THYROXINE: CPT

## 2023-12-04 PROCEDURE — 36415 COLL VENOUS BLD VENIPUNCTURE: CPT

## 2023-12-04 NOTE — PROGRESS NOTES
Assessment and Plan:     Problem List Items Addressed This Visit        Endocrine    Acquired hypothyroidism     Patient is not taking any thyroid replacement. His TSH is mildly elevated, but his T4 is in the normal range. Relevant Orders    TSH, 3rd generation with Free T4 reflex       Cardiovascular and Mediastinum    Atrial fibrillation ([address])     He has persistent A-fib. He is taking Eliquis and metoprolol. The rate control is pretty good. It is completely asymptomatic except for SOBOE. Continue those medications, continue following up with cardiologist.         Relevant Orders    Comprehensive metabolic panel    Nonrheumatic mitral valve regurgitation     Moderate mitral valve regurg. Seen on echo last summer. Continue follow-up with cardiology. Other Visit Diagnoses     Preventative health care    -  Primary    Screening for diabetes mellitus        Relevant Orders    Comprehensive metabolic panel    Screening for lipid disorders        Relevant Orders    Lipid Panel with Direct LDL reflex    Screening for iron deficiency anemia        Relevant Orders    CBC and differential          Depression Screening and Follow-up Plan: Patient was screened for depression during today's encounter. They screened negative with a PHQ-2 score of 0. Preventive health issues were discussed with patient, and age appropriate screening tests were ordered as noted in patient's After Visit Summary. Personalized health advice and appropriate referrals for health education or preventive services given if needed, as noted in patient's After Visit Summary. History of Present Illness:     Patient presents for a Medicare Wellness Visit    This is a very pleasant 67-year-old man. He lives with his wife. They never had any children. He is retired form a garment cutting room.        Patient Care Team:  Dustin Warren MD as PCP - MD Nadege Cruz MD Hunter Chamorro, MD     Review of Systems:     Review of Systems   Constitutional:  Negative for activity change, fatigue and fever. HENT:  Negative for congestion, ear discharge, ear pain, postnasal drip, rhinorrhea, sinus pain, sneezing and sore throat. Eyes:  Negative for photophobia, pain, discharge and redness. Respiratory:  Negative for apnea, cough, shortness of breath and wheezing. Cardiovascular:  Negative for chest pain and palpitations. Gastrointestinal:  Negative for abdominal pain, blood in stool, constipation, diarrhea, nausea and vomiting. Endocrine: Negative for polydipsia, polyphagia and polyuria. Genitourinary:  Negative for decreased urine volume, difficulty urinating, dysuria, frequency, penile discharge, penile pain and urgency. Musculoskeletal:  Negative for arthralgias, gait problem, joint swelling and neck pain. Skin:  Negative for color change and rash. Neurological:  Negative for dizziness, tremors, seizures, weakness and headaches. Psychiatric/Behavioral:  Negative for agitation and sleep disturbance. The patient is not nervous/anxious.          Problem List:     Patient Active Problem List   Diagnosis   • Benign prostatic hyperplasia (BPH) with urinary urge incontinence   • Acquired hypothyroidism   • Allergic rhinitis due to pollen   • Elevated HDL   • Skin lesion of neck   • History of basal cell carcinoma   • Atrial fibrillation (HCC)   • Nonrheumatic mitral valve regurgitation      Past Medical and Surgical History:     Past Medical History:   Diagnosis Date   • Allergic 1956   • Basal cell carcinoma    • Cancer (HCC) unknown   • Disease of thyroid gland    • Visual impairment unknown     Past Surgical History:   Procedure Laterality Date   • HERNIA REPAIR      1970's   • OTHER SURGICAL HISTORY      ohs icrographic Surgery Face ; right upper lip      Family History:     Family History   Problem Relation Age of Onset   • Heart attack Father    • Multiple sclerosis Sister    • Coronary artery disease Brother       Social History:     Social History     Socioeconomic History   • Marital status: /Civil Union     Spouse name: None   • Number of children: None   • Years of education: None   • Highest education level: None   Occupational History   • None   Tobacco Use   • Smoking status: Never   • Smokeless tobacco: Never   • Tobacco comments:     smoked for 1 year at age 13   Substance and Sexual Activity   • Alcohol use: Not Currently     Alcohol/week: 0.0 standard drinks of alcohol     Comment: social   • Drug use: No   • Sexual activity: Not Currently   Other Topics Concern   • None   Social History Narrative    Caffeine use     Social Determinants of Health     Financial Resource Strain: Low Risk  (11/22/2022)    Overall Financial Resource Strain (CARDIA)    • Difficulty of Paying Living Expenses: Not very hard   Food Insecurity: Not on file   Transportation Needs: No Transportation Needs (11/29/2023)    PRAPARE - Transportation    • Lack of Transportation (Medical): No    • Lack of Transportation (Non-Medical):  No   Physical Activity: Not on file   Stress: Not on file   Social Connections: Not on file   Intimate Partner Violence: Not on file   Housing Stability: Not on file      Medications and Allergies:     Current Outpatient Medications   Medication Sig Dispense Refill   • Alpha Lipoic Acid 200 MG CAPS      • apixaban (Eliquis) 5 mg Take 1 tablet (5 mg total) by mouth 2 (two) times a day Lot [id number] exp 9/24. 180 tablet 3   • Ascorbic Acid (Vitamin C) 500 MG CAPS      • B Complex Vitamins (VITAMIN B COMPLEX PO) Take by mouth     • Coenzyme Q10 (CoQ10) 100 MG CAPS      • metoprolol succinate (TOPROL-XL) 25 mg 24 hr tablet Take 1 tablet (25 mg total) by mouth daily 90 tablet 3   • Multiple Vitamin (DAILY VALUE MULTIVITAMIN) TABS Take by mouth     • Omega 3 1200 MG CAPS      • Saw Palmetto, Serenoa repens, 450 MG CAPS Take by mouth     • SELENIUM PO 15 mg     • Sodium Fluoride 1.1 % PSTE PreviDent 5000 Booster 1.1 % Dental Paste; 100.00; 0; 21-Apr-2012; 13-Dec-2010; Active     • Vitamin E 400 units TABS      • Zinc 30 MG TABS        No current facility-administered medications for this visit. No Known Allergies   Immunizations:     Immunization History   Administered Date(s) Administered   • COVID-19 PFIZER VACCINE 0.3 ML IM 02/13/2021, 03/08/2021, 09/25/2021   • COVID-19 Pfizer vac (Juan-sucrose, gray cap) 12 yr+ IM 04/09/2022   • INFLUENZA 10/25/2022   • Influenza Split High Dose Preservative Free IM 09/30/2015, 10/20/2016, 10/24/2017   • Influenza, high dose seasonal 0.7 mL 11/05/2018, 11/11/2019, 10/07/2020, 10/20/2021, 10/25/2022, 11/01/2023   • Pneumococcal Conjugate 13-Valent 08/28/2017   • Pneumococcal Polysaccharide PPV23 12/15/2008   • Tdap 06/13/2012      Health Maintenance: There are no preventive care reminders to display for this patient. Topic Date Due   • COVID-19 Vaccine (5 - Pfizer series) 06/04/2022      Medicare Screening Tests and Risk Assessments:     Aisha Dao is here for his Subsequent Wellness visit. Health Risk Assessment:   Patient rates overall health as very good. Patient feels that their physical health rating is same. Patient is satisfied with their life. Eyesight was rated as same. Hearing was rated as same. Patient feels that their emotional and mental health rating is same. Patients states they are never, rarely angry. Patient states they are never, rarely unusually tired/fatigued. Pain experienced in the last 7 days has been none. Patient states that he has experienced no weight loss or gain in last 6 months. Depression Screening:   PHQ-2 Score: 0      Fall Risk Screening: In the past year, patient has experienced: no history of falling in past year      Home Safety:  Patient does not have trouble with stairs inside or outside of their home. Patient has working smoke alarms and has working carbon monoxide detector.  Home safety hazards include: household clutter. Nutrition:   Current diet is Regular and Frequent junk food. Medications:   Patient is currently taking over-the-counter supplements. OTC medications include: see medication list. Patient is able to manage medications. Activities of Daily Living (ADLs)/Instrumental Activities of Daily Living (IADLs):   Walk and transfer into and out of bed and chair?: Yes  Dress and groom yourself?: Yes    Bathe or shower yourself?: Yes    Feed yourself? Yes  Do your laundry/housekeeping?: Yes  Manage your money, pay your bills and track your expenses?: Yes  Make your own meals?: Yes    Do your own shopping?: Yes    Previous Hospitalizations:   Any hospitalizations or ED visits within the last 12 months?: No      Advance Care Planning:   Living will: Yes    Durable POA for healthcare: Yes    Advanced directive: Yes      PREVENTIVE SCREENINGS      Cardiovascular Screening:    General: Screening Current      Prostate Cancer Screening:    General: Screening Not Indicated      Lung Cancer Screening:     General: Screening Not Indicated    Screening, Brief Intervention, and Referral to Treatment (SBIRT)    Screening  Typical number of drinks in a day: 0  Typical number of drinks in a week: 0  Interpretation: Low risk drinking behavior. AUDIT-C Screenin) How often did you have a drink containing alcohol in the past year? never  2) How many drinks did you have on a typical day when you were drinking in the past year? 0  3) How often did you have 6 or more drinks on one occasion in the past year? never    AUDIT-C Score: 0  Interpretation: Score 0-3 (male): Negative screen for alcohol misuse    Single Item Drug Screening:  How often have you used an illegal drug (including marijuana) or a prescription medication for non-medical reasons in the past year? never    Single Item Drug Screen Score: 0  Interpretation: Negative screen for possible drug use disorder    No results found.      Physical Exam:     BP 128/74 (BP Location: Left arm, Patient Position: Sitting, Cuff Size: Adult)   Pulse 94   Resp 18   Ht 5' 10" (1.778 m)   Wt 71.2 kg (156 lb 14.4 oz)   BMI 22.51 kg/m²     Physical Exam  Vitals and nursing note reviewed. Constitutional:       General: He is not in acute distress. Appearance: Normal appearance. He is well-developed. He is not ill-appearing. HENT:      Head: Normocephalic. Right Ear: Tympanic membrane, ear canal and external ear normal.      Left Ear: Tympanic membrane, ear canal and external ear normal.      Nose: Nose normal. No rhinorrhea. Mouth/Throat:      Mouth: Mucous membranes are moist.      Pharynx: Uvula midline. No oropharyngeal exudate. Tonsils: No tonsillar exudate. Eyes:      General: Lids are normal.      Conjunctiva/sclera: Conjunctivae normal.      Pupils: Pupils are equal, round, and reactive to light. Neck:      Thyroid: No thyromegaly. Vascular: No carotid bruit. Trachea: Trachea normal.   Cardiovascular:      Rate and Rhythm: Normal rate and regular rhythm. Pulses: Normal pulses. Heart sounds: Normal heart sounds, S1 normal and S2 normal. No murmur heard. Pulmonary:      Effort: Pulmonary effort is normal. No respiratory distress. Breath sounds: Normal breath sounds. Abdominal:      General: Bowel sounds are normal.      Palpations: Abdomen is soft. Tenderness: There is no abdominal tenderness. Hernia: No hernia is present. Musculoskeletal:         General: Normal range of motion. Cervical back: Normal range of motion and neck supple. Lymphadenopathy:      Cervical: No cervical adenopathy. Skin:     General: Skin is warm and dry. Neurological:      General: No focal deficit present. Mental Status: He is alert and oriented to person, place, and time. Cranial Nerves: No cranial nerve deficit. Sensory: No sensory deficit.       Gait: Gait normal.      Deep Tendon Reflexes: Reflexes are normal and symmetric. Psychiatric:         Speech: Speech normal.         Behavior: Behavior normal. Behavior is cooperative. Thought Content:  Thought content normal.          Dia Donovan MD

## 2023-12-04 NOTE — ASSESSMENT & PLAN NOTE
He has persistent A-fib. He is taking Eliquis and metoprolol. The rate control is pretty good. It is completely asymptomatic except for SOBOE.     Continue those medications, continue following up with cardiologist.

## 2023-12-04 NOTE — ASSESSMENT & PLAN NOTE
Patient is not taking any thyroid replacement. His TSH is mildly elevated, but his T4 is in the normal range.

## 2023-12-06 ENCOUNTER — OFFICE VISIT (OUTPATIENT)
Dept: CARDIOLOGY CLINIC | Facility: MEDICAL CENTER | Age: 83
End: 2023-12-06
Payer: COMMERCIAL

## 2023-12-06 VITALS
BODY MASS INDEX: 22.19 KG/M2 | DIASTOLIC BLOOD PRESSURE: 70 MMHG | SYSTOLIC BLOOD PRESSURE: 120 MMHG | HEART RATE: 84 BPM | HEIGHT: 70 IN | WEIGHT: 155 LBS | OXYGEN SATURATION: 99 %

## 2023-12-06 DIAGNOSIS — I34.0 NONRHEUMATIC MITRAL VALVE REGURGITATION: ICD-10-CM

## 2023-12-06 DIAGNOSIS — I48.19 PERSISTENT ATRIAL FIBRILLATION (HCC): Primary | ICD-10-CM

## 2023-12-06 PROCEDURE — 93000 ELECTROCARDIOGRAM COMPLETE: CPT | Performed by: INTERNAL MEDICINE

## 2023-12-06 PROCEDURE — 99214 OFFICE O/P EST MOD 30 MIN: CPT | Performed by: INTERNAL MEDICINE

## 2023-12-06 NOTE — PROGRESS NOTES
Cardiology   Debi Rogel 81 y.o. male MRN: [id number]        Impression:  1. Persistent atrial fibrillation - asymptomatic. Started on Eliquis. Asymptomatic. 2. Mitral regurgitation - mild-mod MR 7/23     Recommendations:  1. Continue current medications. 2. Follow up in 6 months. HPI: Debi Rogel is a 81y.o. year old male with paroxysmal atrial fibrillation who presents for follow up. Asymptomatic. No chest pain, shortness of breath, or palpitations. Echo 12/21 demonstrated normal LV systolic function, mild LVH, and moderate mitral regurgitation. Review of Systems   Constitutional: Negative. HENT: Negative. Eyes: Negative. Respiratory:  Negative for chest tightness and shortness of breath. Cardiovascular:  Negative for chest pain, palpitations and leg swelling. Gastrointestinal: Negative. Endocrine: Negative. Genitourinary: Negative. Musculoskeletal: Negative. Skin: Negative. Allergic/Immunologic: Negative. Neurological: Negative. Hematological: Negative. Psychiatric/Behavioral: Negative. All other systems reviewed and are negative.         Past Medical History:   Diagnosis Date    Allergic 1956    Basal cell carcinoma     Cancer ([address] ) unknown    Disease of thyroid gland     Visual impairment unknown     Past Surgical History:   Procedure Laterality Date    HERNIA REPAIR      1970's    OTHER SURGICAL HISTORY      ohs icrographic Surgery Face ; right upper lip     Social History     Substance and Sexual Activity   Alcohol Use Not Currently    Alcohol/week: 0.0 standard drinks of alcohol    Comment: social     Social History     Substance and Sexual Activity   Drug Use No     Social History     Tobacco Use   Smoking Status Never   Smokeless Tobacco Never   Tobacco Comments    smoked for 1 year at age 13     Family History   Problem Relation Age of Onset    Heart attack Father     Multiple sclerosis Sister     Coronary artery disease Brother Allergies:  No Known Allergies    Medications:     Current Outpatient Medications:     Alpha Lipoic Acid 200 MG CAPS, , Disp: , Rfl:     apixaban (Eliquis) 5 mg, Take 1 tablet (5 mg total) by mouth 2 (two) times a day Lot [id number] exp 9/24., Disp: 180 tablet, Rfl: 3    Ascorbic Acid (Vitamin C) 500 MG CAPS, , Disp: , Rfl:     B Complex Vitamins (VITAMIN B COMPLEX PO), Take by mouth, Disp: , Rfl:     Coenzyme Q10 (CoQ10) 100 MG CAPS, , Disp: , Rfl:     metoprolol succinate (TOPROL-XL) 25 mg 24 hr tablet, Take 1 tablet (25 mg total) by mouth daily, Disp: 90 tablet, Rfl: 3    Multiple Vitamin (DAILY VALUE MULTIVITAMIN) TABS, Take by mouth, Disp: , Rfl:     Omega 3 1200 MG CAPS, , Disp: , Rfl:     Saw Palmetto, Serenoa repens, 450 MG CAPS, Take by mouth, Disp: , Rfl:     SELENIUM PO, 15 mg, Disp: , Rfl:     Sodium Fluoride 1.1 % PSTE, PreviDent 5000 Booster 1.1 % Dental Paste; 100.00; 0; 21-Apr-2012; 13-Dec-2010; Active, Disp: , Rfl:     Vitamin E 400 units TABS, , Disp: , Rfl:     Zinc 30 MG TABS, , Disp: , Rfl:       Wt Readings from Last 3 Encounters:   12/06/23 70.3 kg (155 lb)   12/04/23 71.2 kg (156 lb 14.4 oz)   07/13/23 73.9 kg (163 lb)     Temp Readings from Last 3 Encounters:   11/19/21 98.3 °F (36.8 °C)   11/19/20 97.8 °F (36.6 °C)   07/14/20 98 °F (36.7 °C) (Temporal)     BP Readings from Last 3 Encounters:   12/06/23 120/70   12/04/23 128/74   07/13/23 120/60     Pulse Readings from Last 3 Encounters:   12/06/23 84   12/04/23 94   07/13/23 90         Physical Exam  HENT:      Head: Atraumatic. Mouth/Throat:      Mouth: Mucous membranes are moist.   Eyes:      Extraocular Movements: Extraocular movements intact. Cardiovascular:      Rate and Rhythm: Normal rate. Rhythm irregularly irregular. Heart sounds: Normal heart sounds. Pulmonary:      Effort: Pulmonary effort is normal.      Breath sounds: Normal breath sounds. Abdominal:      General: Abdomen is flat.    Musculoskeletal: General: Normal range of motion. Cervical back: Normal range of motion. Skin:     General: Skin is warm. Neurological:      General: No focal deficit present. Mental Status: He is alert and oriented to person, place, and time.    Psychiatric:         Mood and Affect: Mood normal.         Behavior: Behavior normal.           Laboratory Studies:  CMP:  Lab Results   Component Value Date     07/29/2015    K 4.3 12/04/2023     12/04/2023    CO2 29 12/04/2023    ANIONGAP 7 07/29/2015    BUN 23 12/04/2023    CREATININE 0.68 12/04/2023    GLUCOSE 88 07/29/2015    AST 22 12/04/2023    ALT 23 12/04/2023    BILITOT 0.49 07/29/2015    EGFR 88 12/04/2023       Lipid Profile:   Lab Results   Component Value Date    CHOL 182 07/29/2015     Lab Results   Component Value Date    HDL 73 12/04/2023     Lab Results   Component Value Date    LDLCALC 112 (H) 12/04/2023     Lab Results   Component Value Date    TRIG 54 12/04/2023       Cardiac testing:   EKG reviewed personally: Afib 74 RBBB

## 2024-01-02 DIAGNOSIS — I48.91 ATRIAL FIBRILLATION, UNSPECIFIED TYPE (HCC): ICD-10-CM

## 2024-01-02 RX ORDER — METOPROLOL SUCCINATE 25 MG/1
25 TABLET, EXTENDED RELEASE ORAL DAILY
Qty: 90 TABLET | Refills: 3 | Status: SHIPPED | OUTPATIENT
Start: 2024-01-02

## 2024-01-12 ENCOUNTER — NURSE TRIAGE (OUTPATIENT)
Dept: OTHER | Facility: OTHER | Age: 84
End: 2024-01-12

## 2024-01-12 NOTE — TELEPHONE ENCOUNTER
Regarding: ongoing cough  ----- Message from Regina Faith sent at 1/12/2024  8:36 AM EST -----  Pt was calling in stating that he has this on going coughing for almost two weeks now that he can't get rid of. Pt was trying to come in today for a visit but the office is short staff today and are not taking any appts was advise to triage the pt or tried to give him an appt for Monday. Offered the pt those options and stated he would like to speak with a clinic nurse, please advise with the pt thank you.

## 2024-01-12 NOTE — TELEPHONE ENCOUNTER
"Reason for Disposition  • Continuous (nonstop) coughing interferes with work or school and no improvement using cough treatment per Care Advice    Answer Assessment - Initial Assessment Questions  1. ONSET: \"When did the cough begin?\"       2 weeks ago  2. SEVERITY: \"How bad is the cough today?\"       Persisted   3. SPUTUM: \"Describe the color of your sputum\" (none, dry cough; clear, white, yellow, green)      No   4. HEMOPTYSIS: \"Are you coughing up any blood?\" If so ask: \"How much?\" (flecks, streaks, tablespoons, etc.)      No   5. DIFFICULTY BREATHING: \"Are you having difficulty breathing?\" If Yes, ask: \"How bad is it?\" (e.g., mild, moderate, severe)     - MILD: No SOB at rest, mild SOB with walking, speaks normally in sentences, can lay down, no retractions, pulse < 100.     - MODERATE: SOB at rest, SOB with minimal exertion and prefers to sit, cannot lie down flat, speaks in phrases, mild retractions, audible wheezing, pulse 100-120.     - SEVERE: Very SOB at rest, speaks in single words, struggling to breathe, sitting hunched forward, retractions, pulse > 120       No SOB   6. FEVER: \"Do you have a fever?\" If Yes, ask: \"What is your temperature, how was it measured, and when did it start?\"      No   7. CARDIAC HISTORY: \"Do you have any history of heart disease?\" (e.g., heart attack, congestive heart failure)       No   8. LUNG HISTORY: \"Do you have any history of lung disease?\"  (e.g., pulmonary embolus, asthma, emphysema)      No   9. PE RISK FACTORS: \"Do you have a history of blood clots?\" (or: recent major surgery, recent prolonged travel, bedridden)      No   10. OTHER SYMPTOMS: \"Do you have any other symptoms?\" (e.g., runny nose, wheezing, chest pain)        Sometimes runny nose    Protocols used: Cough-ADULT-[place]    "

## 2024-01-15 ENCOUNTER — OFFICE VISIT (OUTPATIENT)
Dept: FAMILY MEDICINE CLINIC | Facility: MEDICAL CENTER | Age: 84
End: 2024-01-15
Payer: COMMERCIAL

## 2024-01-15 VITALS
HEIGHT: 70 IN | HEART RATE: 88 BPM | BODY MASS INDEX: 22.48 KG/M2 | OXYGEN SATURATION: 97 % | SYSTOLIC BLOOD PRESSURE: 136 MMHG | RESPIRATION RATE: 18 BRPM | TEMPERATURE: 96.5 F | WEIGHT: 157 LBS | DIASTOLIC BLOOD PRESSURE: 72 MMHG

## 2024-01-15 DIAGNOSIS — J32.9 RHINOSINUSITIS: Primary | ICD-10-CM

## 2024-01-15 PROCEDURE — 99213 OFFICE O/P EST LOW 20 MIN: CPT | Performed by: FAMILY MEDICINE

## 2024-01-15 RX ORDER — AMOXICILLIN 500 MG/1
500 CAPSULE ORAL EVERY 8 HOURS SCHEDULED
Qty: 21 CAPSULE | Refills: 0 | Status: SHIPPED | OUTPATIENT
Start: 2024-01-15 | End: 2024-01-22

## 2024-01-15 NOTE — PROGRESS NOTES
"Assessment/Plan:      Diagnoses and all orders for this visit:    Rhinosinusitis  -     amoxicillin (AMOXIL) 500 mg capsule; Take 1 capsule (500 mg total) by mouth every 8 (eight) hours for 7 days          Subjective:     Patient ID: Guero Hinds is a 83 y.o. male.    Cough  This is a new problem. The current episode started 1 to 4 weeks ago. The problem has been unchanged. The problem occurs every few minutes. The cough is Non-productive. Associated symptoms include nasal congestion, postnasal drip and rhinorrhea. Pertinent negatives include no chest pain, chills, ear congestion, ear pain, fever, headaches, myalgias, sore throat or shortness of breath. The symptoms are aggravated by lying down. He has tried OTC cough suppressant for the symptoms. The treatment provided moderate relief. There is no history of asthma or emphysema.       Review of Systems   Constitutional:  Negative for chills and fever.   HENT:  Positive for postnasal drip and rhinorrhea. Negative for ear pain and sore throat.    Respiratory:  Positive for cough. Negative for shortness of breath.    Cardiovascular:  Negative for chest pain.   Musculoskeletal:  Negative for myalgias.   Neurological:  Negative for headaches.         Objective:    /72 (BP Location: Left arm, Patient Position: Sitting, Cuff Size: Adult)   Pulse 88   Temp (!) 96.5 °F (35.8 °C)   Resp 18   Ht 5' 10\" (1.778 m)   Wt 71.2 kg (157 lb)   SpO2 97%   BMI 22.53 kg/m²      Physical Exam  Constitutional:       Appearance: Normal appearance. He is not ill-appearing.   HENT:      Head: Normocephalic.      Right Ear: Hearing, tympanic membrane, ear canal and external ear normal.      Left Ear: Hearing, tympanic membrane, ear canal and external ear normal.      Nose: No mucosal edema or rhinorrhea.      Mouth/Throat:      Pharynx: Posterior oropharyngeal erythema present. No oropharyngeal exudate.   Eyes:      Conjunctiva/sclera: Conjunctivae normal.      Pupils: Pupils " are equal, round, and reactive to light.   Neck:      Thyroid: No thyromegaly.   Cardiovascular:      Rate and Rhythm: Normal rate and regular rhythm.      Heart sounds: Normal heart sounds.   Pulmonary:      Effort: Pulmonary effort is normal. No accessory muscle usage or respiratory distress.      Breath sounds: Normal breath sounds. No wheezing or rales.   Musculoskeletal:         General: Normal range of motion.   Lymphadenopathy:      Cervical: No cervical adenopathy.   Skin:     General: Skin is warm and dry.   Neurological:      General: No focal deficit present.

## 2024-03-27 DIAGNOSIS — I48.20 CHRONIC ATRIAL FIBRILLATION (HCC): ICD-10-CM

## 2024-03-27 DIAGNOSIS — I48.91 ATRIAL FIBRILLATION, UNSPECIFIED TYPE (HCC): ICD-10-CM

## 2024-03-27 RX ORDER — METOPROLOL SUCCINATE 25 MG/1
25 TABLET, EXTENDED RELEASE ORAL DAILY
Qty: 100 TABLET | Refills: 3 | Status: SHIPPED | OUTPATIENT
Start: 2024-03-27

## 2024-06-05 ENCOUNTER — OFFICE VISIT (OUTPATIENT)
Dept: CARDIOLOGY CLINIC | Facility: MEDICAL CENTER | Age: 84
End: 2024-06-05
Payer: COMMERCIAL

## 2024-06-05 VITALS
BODY MASS INDEX: 22.9 KG/M2 | SYSTOLIC BLOOD PRESSURE: 122 MMHG | DIASTOLIC BLOOD PRESSURE: 70 MMHG | HEIGHT: 70 IN | OXYGEN SATURATION: 100 % | WEIGHT: 160 LBS | HEART RATE: 70 BPM

## 2024-06-05 DIAGNOSIS — I34.0 NONRHEUMATIC MITRAL VALVE REGURGITATION: ICD-10-CM

## 2024-06-05 DIAGNOSIS — I48.19 PERSISTENT ATRIAL FIBRILLATION (HCC): Primary | ICD-10-CM

## 2024-06-05 PROCEDURE — 93000 ELECTROCARDIOGRAM COMPLETE: CPT | Performed by: INTERNAL MEDICINE

## 2024-06-05 PROCEDURE — 99214 OFFICE O/P EST MOD 30 MIN: CPT | Performed by: INTERNAL MEDICINE

## 2024-06-05 NOTE — PROGRESS NOTES
Cardiology   Guero Hinds 83 y.o. male MRN: [id number]        Impression:  1. Persistent atrial fibrillation - asymptomatic.  Started on Eliquis.  Asymptomatic.     2. Mitral regurgitation - mild-mod MR 7/23     Recommendations:  1. Continue current medications.  2. Follow up in 6 months.        HPI: Guero Hinds is a 83 y.o. year old male with paroxysmal atrial fibrillation who presents for follow up. Asymptomatic.  No chest pain, shortness of breath, or palpitations.  Echo 12/21 demonstrated normal LV systolic function, mild LVH, and moderate mitral regurgitation.    Repeat echo 7/23 - mild-mod MR.          Review of Systems   Constitutional: Negative.    HENT: Negative.     Eyes: Negative.    Respiratory:  Negative for chest tightness and shortness of breath.    Cardiovascular:  Negative for chest pain, palpitations and leg swelling.   Gastrointestinal: Negative.    Endocrine: Negative.    Genitourinary: Negative.    Musculoskeletal: Negative.    Skin: Negative.    Allergic/Immunologic: Negative.    Neurological: Negative.    Hematological: Negative.    Psychiatric/Behavioral: Negative.     All other systems reviewed and are negative.        Past Medical History:   Diagnosis Date    Allergic 1956    Basal cell carcinoma     Cancer ([facility]) unknown    Disease of thyroid gland     Visual impairment unknown     Past Surgical History:   Procedure Laterality Date    HERNIA REPAIR      1970's    OTHER SURGICAL HISTORY      ohs icrographic Surgery Face ; right upper lip     Social History     Substance and Sexual Activity   Alcohol Use Not Currently    Alcohol/week: 0.0 standard drinks of alcohol    Comment: social     Social History     Substance and Sexual Activity   Drug Use No     Social History     Tobacco Use   Smoking Status Never   Smokeless Tobacco Never   Tobacco Comments    smoked for 1 year at age 17     Family History   Problem Relation Age of Onset    Heart attack Father     Multiple sclerosis Sister      Coronary artery disease Brother        Allergies:  No Known Allergies    Medications:     Current Outpatient Medications:     Alpha Lipoic Acid 200 MG CAPS, , Disp: , Rfl:     apixaban (Eliquis) 5 mg, Take 1 tablet (5 mg total) by mouth 2 (two) times a day Lot [id number] exp 9/24., Disp: 180 tablet, Rfl: 3    Ascorbic Acid (Vitamin C) 500 MG CAPS, , Disp: , Rfl:     B Complex Vitamins (VITAMIN B COMPLEX PO), Take by mouth, Disp: , Rfl:     Coenzyme Q10 (CoQ10) 100 MG CAPS, , Disp: , Rfl:     metoprolol succinate (TOPROL-XL) 25 mg 24 hr tablet, Take 1 tablet (25 mg total) by mouth daily, Disp: 100 tablet, Rfl: 3    Multiple Vitamin (DAILY VALUE MULTIVITAMIN) TABS, Take by mouth, Disp: , Rfl:     Omega 3 1200 MG CAPS, , Disp: , Rfl:     SELENIUM PO, 15 mg, Disp: , Rfl:     Sodium Fluoride 1.1 % PSTE, PreviDent 5000 Booster 1.1 % Dental Paste; 100.00; 0; 21-Apr-2012; 13-Dec-2010; Active, Disp: , Rfl:     Vitamin E 400 units TABS, , Disp: , Rfl:     Zinc 30 MG TABS, , Disp: , Rfl:     Saw Palmetto, Serenoa repens, 450 MG CAPS, Take by mouth (Patient not taking: Reported on 1/15/2024), Disp: , Rfl:       Wt Readings from Last 3 Encounters:   06/05/24 72.6 kg (160 lb)   01/15/24 71.2 kg (157 lb)   12/06/23 70.3 kg (155 lb)     Temp Readings from Last 3 Encounters:   01/15/24 (!) 96.5 °F (35.8 °C)   11/19/21 98.3 °F (36.8 °C)   11/19/20 97.8 °F (36.6 °C)     BP Readings from Last 3 Encounters:   06/05/24 122/70   01/15/24 136/72   12/06/23 120/70     Pulse Readings from Last 3 Encounters:   06/05/24 70   01/15/24 88   12/06/23 84         Physical Exam  HENT:      Head: Atraumatic.      Mouth/Throat:      Mouth: Mucous membranes are moist.   Eyes:      Extraocular Movements: Extraocular movements intact.   Cardiovascular:      Rate and Rhythm: Normal rate. Rhythm irregularly irregular.      Heart sounds: Normal heart sounds.   Pulmonary:      Effort: Pulmonary effort is normal.      Breath sounds: Normal breath sounds.    Abdominal:      General: Abdomen is flat.   Musculoskeletal:         General: Normal range of motion.      Cervical back: Normal range of motion.   Skin:     General: Skin is warm.   Neurological:      General: No focal deficit present.      Mental Status: He is alert and oriented to person, place, and time.           Laboratory Studies:  CMP:  Lab Results   Component Value Date     07/29/2015    K 4.3 12/04/2023     12/04/2023    CO2 29 12/04/2023    ANIONGAP 7 07/29/2015    BUN 23 12/04/2023    CREATININE 0.68 12/04/2023    GLUCOSE 88 07/29/2015    AST 22 12/04/2023    ALT 23 12/04/2023    BILITOT 0.49 07/29/2015    EGFR 88 12/04/2023       Lipid Profile:   Lab Results   Component Value Date    CHOL 182 07/29/2015     Lab Results   Component Value Date    HDL 73 12/04/2023     Lab Results   Component Value Date    LDLCALC 112 (H) 12/04/2023     Lab Results   Component Value Date    TRIG 54 12/04/2023       Cardiac testing:   EKG reviewed personally: Afib 74 RBBB

## 2024-09-26 ENCOUNTER — TELEPHONE (OUTPATIENT)
Age: 84
End: 2024-09-26

## 2024-09-26 NOTE — TELEPHONE ENCOUNTER
The patient called he would like a script to get his hearing checked   he wants to go to [facility] in [place]   he would like the order put in asap and he would like a call when it is in thank you

## 2024-09-27 DIAGNOSIS — Z01.10 ENCOUNTER FOR HEARING EXAMINATION, UNSPECIFIED WHETHER ABNORMAL FINDINGS: Primary | ICD-10-CM

## 2024-10-01 ENCOUNTER — APPOINTMENT (OUTPATIENT)
Dept: AUDIOLOGY | Age: 84
End: 2024-10-01
Payer: COMMERCIAL

## 2024-10-01 ENCOUNTER — OFFICE VISIT (OUTPATIENT)
Dept: AUDIOLOGY | Age: 84
End: 2024-10-01
Payer: COMMERCIAL

## 2024-10-01 DIAGNOSIS — H90.3 SENSORY HEARING LOSS, BILATERAL: Primary | ICD-10-CM

## 2024-10-01 PROCEDURE — 92567 TYMPANOMETRY: CPT

## 2024-10-01 PROCEDURE — 92557 COMPREHENSIVE HEARING TEST: CPT

## 2024-10-01 NOTE — PROGRESS NOTES
Diagnostic Hearing Evaluation    Name:  Guero Hinds  :  1940  Age:  84 y.o.   MRN:  [id number]  Date of Evaluation: 10/01/24     HISTORY:     Reason for visit: Difficulty Understanding    Guero Hinds is being seen today at the request of Dr. Ying for an initial  evaluation of hearing. The patient reports increased difficulty understanding. The patient  denies otalgia, otorrhea, dizziness, fullness, tinnitus, noise exposure, and family history of hearing loss.     EVALUATION:    Otoscopic Evaluation:   Right Ear: Non-occluding cerumen, TM view obscured    Left Ear: Non-occluding cerumen, TM view obscured     Tympanometry:   Right Ear: Type A; normal middle ear pressure and static compliance    Left Ear: Type A; normal middle ear pressure and static compliance     Speech Audiometry:  Speech Reception (SRT)    Right Ear: 35 dB HL    Left Ear: 35 dB HL    Word Recognition Scores (WRS):  Right Ear: excellent (88 % correct)     Left Ear: excellent (88 % correct)    Stimuli: [id number]    Pure Tone Audiometry:  Conventional pure tone audiometry from 250 - 8000 Hz  was obtained with good reliability and revealed the following:     Right Ear: Mild sloping to severe sensorineural hearing loss (SNHL)    Left Ear: Normal sloping to profound sensorineural hearing loss (SNHL)     *see attached audiogram    RECOMMENDATIONS:  Annual hearing eval, Return to [facility]. for F/U, Hearing Aid Evaluation, Ear Cleaning, and Copy to Patient/Caregiver    Hearing aids were briefly discussed.    PATIENT EDUCATION:   The results of today's results and recommendations were reviewed with the patient and his hearing thresholds were explained at length. Treatment options, including amplification and communication strategies, were discussed as appropriate. The patient voiced understanding of his test results. Questions were addressed and the patient was encouraged to contact our department should concerns arise.      Nicola Peacock.,  CCC-A  Clinical Audiologist  [facility]  [address] [address]

## 2024-10-08 ENCOUNTER — OFFICE VISIT (OUTPATIENT)
Dept: AUDIOLOGY | Age: 84
End: 2024-10-08
Payer: COMMERCIAL

## 2024-10-08 DIAGNOSIS — H90.3 SENSORY HEARING LOSS, BILATERAL: Primary | ICD-10-CM

## 2024-10-08 PROCEDURE — V5160 DISPENSING FEE BINAURAL: HCPCS

## 2024-10-08 PROCEDURE — V5261 HEARING AID, DIGIT, BIN, BTE: HCPCS

## 2024-10-08 NOTE — PROGRESS NOTES
Hearing Aid Evaluation  Name:  Guero Hinds  :  1940  Age:  84 y.o.  MRN:  [id number]  Date of Evaluation: 10/08/24     HISTORY:    Guero Hinds was seen today for a hearing aid evaluation following his audiometric testing performed on 10/1/2024. Guero was referred by Dr. Ying.     RESULTS REVIEW:    The audiometric findings were reviewed with the patient . All of the patient's questions regarding his hearing status were addressed, and the importance of realistic expectations of hearing loss and amplification were discussed.      DEVICE REVIEW & RECOMMENDATION:     Strengths and limitations of amplification, including various hearing aid styles, technology, options, and accessories were discussed at length with patient. Hearing aids are assistive devices and are not designed to restore normal hearing. The patient was counseled on the importance of self-advocacy, motivation, as well as effective communication strategies that can be used to optimize hearing aid success. Based on the degree of his hearing loss, preferences, and lifestyle needs, the following hearing recommendations were made:    The first hearing aid recommendation is Oticon Real 2 miniRITE-R.    [facility]'s office policies regarding our hearing aid program were reviewed, including the 45 day trial period, non-refundable return fees, as well as the  warranties and service plan. Hearing aid cost, and payment, as well as insurance benefit (if applicable) were discussed with the patient.     *See attached quote sheet    DEVICE SELECTION:    At this time, patient wishes to proceed with the purchase of the below listed hearing aid(s).     The patient selected the Oticon Real 2 miniRITE-R hearing aids.    Level: Advanced   Color: chroma beige    size: Right 3X85 / Left 3X85   Dome size: 10 mm DB   Accessories:     Patient choose to opt out of the $500.00 out of network benefit to use the competitive advantage  program.    Patient paid $1,250.00 today.    Nicola Peacock., [facility]-A  Clinical Audiologist  [facility]  [address] [address]

## 2024-10-09 NOTE — PROGRESS NOTES
Hearing aids arrived.  R s/n [id number]  L s/n [id number]   s/n [id number]    Warranty date 11/7/27    Patient is scheduled for 10/11/24.

## 2024-10-10 ENCOUNTER — OFFICE VISIT (OUTPATIENT)
Dept: FAMILY MEDICINE CLINIC | Facility: MEDICAL CENTER | Age: 84
End: 2024-10-10
Payer: COMMERCIAL

## 2024-10-10 VITALS
RESPIRATION RATE: 18 BRPM | SYSTOLIC BLOOD PRESSURE: 120 MMHG | OXYGEN SATURATION: 99 % | WEIGHT: 158.6 LBS | DIASTOLIC BLOOD PRESSURE: 78 MMHG | BODY MASS INDEX: 22.71 KG/M2 | TEMPERATURE: 97.5 F | HEIGHT: 70 IN | HEART RATE: 81 BPM

## 2024-10-10 DIAGNOSIS — H61.21 IMPACTED CERUMEN OF RIGHT EAR: ICD-10-CM

## 2024-10-10 DIAGNOSIS — H61.23 BILATERAL IMPACTED CERUMEN: Primary | ICD-10-CM

## 2024-10-10 PROCEDURE — 69210 REMOVE IMPACTED EAR WAX UNI: CPT | Performed by: STUDENT IN AN ORGANIZED HEALTH CARE EDUCATION/TRAINING PROGRAM

## 2024-10-10 NOTE — PROGRESS NOTES
[facility] - Clinic Note  Claudia Luciano DO, 10/10/24     Guero Hinds MRN: [id number] : 1940 Age: 84 y.o.     Assessment/Plan     1. Bilateral impacted cerumen    -Successful cerumen disimpaction left, will use Debrox drops right ear x 4 days and return to office for remainder of ear flush right    2. Impacted cerumen of right ear    - carbamide peroxide (DEBROX) 6.5 % otic solution; Administer 5 drops to the right ear 2 (two) times a day for 4 days  Dispense: 15 mL; Refill: 0      Guero Hinds acknowledged understanding of treatment plan, all questions answered.    Subjective      Guero Hinds is a 84 y.o. male who presents for ear flush.  He is a patient of Sea Ying MD. patient has decreased hearing of both ears and is getting fitted for a hearing aids tomorrow.    The following portions of the patient's history were reviewed and updated as appropriate: allergies, current medications, past family history, past medical history, past social history, past surgical history and problem list.     Past Medical History:   Diagnosis Date    Allergic     Basal cell carcinoma     Cancer (HCC) unknown    Disease of thyroid gland     Visual impairment unknown       No Known Allergies    Past Surgical History:   Procedure Laterality Date    HERNIA REPAIR      's    OTHER SURGICAL HISTORY      ohs icrographic Surgery Face ; right upper lip       Family History   Problem Relation Age of Onset    Heart attack Father     Multiple sclerosis Sister     Coronary artery disease Brother        Social History     Socioeconomic History    Marital status: /Civil Union     Spouse name: None    Number of children: None    Years of education: None    Highest education level: None   Occupational History    None   Tobacco Use    Smoking status: Never    Smokeless tobacco: Never    Tobacco comments:     smoked for 1 year at age 17   Vaping Use    Vaping status: Never Used   Substance and Sexual  "Activity    Alcohol use: Not Currently     Alcohol/week: 0.0 standard drinks of alcohol     Comment: social    Drug use: No    Sexual activity: Not Currently   Other Topics Concern    None   Social History Narrative    Caffeine use     Social Determinants of Health     Financial Resource Strain: Low Risk  (11/22/2022)    Overall Financial Resource Strain (CARDIA)     Difficulty of Paying Living Expenses: Not very hard   Food Insecurity: Not on file   Transportation Needs: No Transportation Needs (11/29/2023)    PRAPARE - Transportation     Lack of Transportation (Medical): No     Lack of Transportation (Non-Medical): No   Physical Activity: Not on file   Stress: Not on file   Social Connections: Not on file   Intimate Partner Violence: Not on file   Housing Stability: Not on file       Current Outpatient Medications   Medication Sig Dispense Refill    Alpha Lipoic Acid 200 MG CAPS       apixaban (Eliquis) 5 mg Take 1 tablet (5 mg total) by mouth 2 (two) times a day Lot [id number] exp 9/24. 180 tablet 3    Ascorbic Acid (Vitamin C) 500 MG CAPS       B Complex Vitamins (VITAMIN B COMPLEX PO) Take by mouth      Coenzyme Q10 (CoQ10) 100 MG CAPS       metoprolol succinate (TOPROL-XL) 25 mg 24 hr tablet Take 1 tablet (25 mg total) by mouth daily 100 tablet 3    Multiple Vitamin (DAILY VALUE MULTIVITAMIN) TABS Take by mouth      Omega 3 1200 MG CAPS       SELENIUM PO 15 mg      Sodium Fluoride 1.1 % PSTE PreviDent 5000 Booster 1.1 % Dental Paste; 100.00; 0; 21-Apr-2012; 13-Dec-2010; Active      Vitamin E 400 units TABS       Zinc 30 MG TABS       Saw Palmetto, Serenoa repens, 450 MG CAPS Take by mouth (Patient not taking: Reported on 1/15/2024)       No current facility-administered medications for this visit.       Review of Systems     As noted in HPI    Objective      /78 (BP Location: Left arm, Patient Position: Sitting, Cuff Size: Standard)   Pulse 81   Temp 97.5 °F (36.4 °C) (Temporal)   Resp 18   Ht 5' 10\" " "(1.778 m)   Wt 71.9 kg (158 lb 9.6 oz)   SpO2 99%   BMI 22.76 kg/m²     Physical Exam  Vitals reviewed.   Constitutional:       General: He is not in acute distress.     Appearance: Normal appearance.   HENT:      Head: Normocephalic and atraumatic.      Right Ear: There is impacted cerumen.      Left Ear: There is impacted cerumen.   Eyes:      Conjunctiva/sclera: Conjunctivae normal.   Pulmonary:      Effort: Pulmonary effort is normal.   Neurological:      Mental Status: He is alert and oriented to person, place, and time.   Psychiatric:         Mood and Affect: Mood normal.         Behavior: Behavior normal.         Thought Content: Thought content normal.               Ear cerumen removal    Date/Time: 10/10/2024 10:40 AM    Performed by: Claudia Luciano DO  Authorized by: Claudia Luciano DO  Universal Protocol:  procedure performed by consultantConsent: Verbal consent obtained.  Consent given by: patient  Time out: Immediately prior to procedure a \"time out\" was called to verify the correct patient, procedure, equipment, support staff and site/side marked as required.  Patient understanding: patient states understanding of the procedure being performed  Patient consent: the patient's understanding of the procedure matches consent given  Procedure consent: procedure consent matches procedure scheduled  Patient identity confirmed: verbally with patient    Patient location:  Clinic  Procedure details:     Local anesthetic:  None    Location:  Both ears    Procedure type: irrigation with instrumentation      Approach:  External  Post-procedure details:     Complication:  Macerated skin    Patient tolerance of procedure:  Tolerated well, no immediate complications  Comments:      Cerumen disimpaction successful left ear.  Remnant cerumen affixed right eardrum.  Wax more externally was removed.  Patient should use Debrox drops right ear x 4 days and return to office for remainder of flush right ear.      Some " "portions of this record may have been generated with voice recognition software. There may be translation, syntax, or grammatical errors. Occasional wrong word or \"sound-a-like\" substitutions may have occurred due to the inherent limitations of the voice recognition software. Read the chart carefully and recognize, using context, where substations may have occurred. If you have any questions, please contact the dictating provider for clarification or correction, as needed.  "

## 2024-10-11 ENCOUNTER — OFFICE VISIT (OUTPATIENT)
Dept: AUDIOLOGY | Age: 84
End: 2024-10-11

## 2024-10-11 DIAGNOSIS — H90.3 SENSORY HEARING LOSS, BILATERAL: Primary | ICD-10-CM

## 2024-10-11 NOTE — PROGRESS NOTES
Hearing Aid Fitting    Name:  Guero Hinds  :  1940  Age:  84 y.o.  MRN:  [id number]  Date of Evaluation: 10/11/24     HISTORY:    Guero Hinds was seen today for a binaural hearing aid fitting of his Oticon Real 2 miniRITE-R  in the canal (CINTIA) hearing aid(s). Guero was accompanied by his wife to today's visit.. Hearing aid purchase is being paid by private Pay.    DEVICE INFORMATION:     Left Device Right Device   Hearing Aid Make: Oticon  Oticon    Hearing Aid Model: Real 2 miniRITE-R Real 2 miniRITE-R   Serial Number: [id number]   Repair Warranty Date: 2027   Loss/Damage Warranty Status: Active  Active        Length/Output 3X85 3X85   Wax System: Pro Wax miniFIT Pro Wax miniFIT   Dome Size/Style: 10 mm DB 10 mm DB   Battery: Lithium-ion Rechargeable Lithium-ion Rechargeable      Earmold Serial Number: N/A N/A   Earmold Warranty Date:  N/A N/A    Serial Number:  [id number]    Warranty Date:  2027     Accessories: N/A       DEVICE SETTINGS:    Hearing aid(s) were programmed using NAL-NL2 fitting formula and were adjusted based on the patient's perceived comfort level. Hearing aid(s) were set to  80% of her prescription  per patient's subjective listening preference. The patient noted good sound quality, and was happy with the overall sound quality and fit of the hearing aid(s).    DEVICE ORIENTATION:    The patient was counseled on device components and component function. Proper insertion and removal of the aid(s) was demonstrated. The patient practiced insertion and removal of the devices in the office, they demonstrated excellent ability to manipulate the hearing aids. The patient  was given the devices users manual that reviews aid usage and operation, hearing aid cleaning tools, and hearing aid carrying case.     The hearing aid warranty, including unlimited repair and a one time loss and damage per hearing aid, through the , as well as  [facility]'s hearing aid service plan, including unlimited office visits, and supplies were outlined thoroughly. The patient agreed to the terms of sale listed on the purchase agreement containing device specifications, warranties, pricing information, as well as [facility]'s 45-day trial period timeline. After this period has elapsed, hearing aids cannot be returned.    Patient paid remaining $1,250.00 today.    Patient is waiting to have cerumen removed from his right ear after Debrox use.    DEVICE EXPECTATIONS & USAGE:     Hearing aids are assistive devices and are not designed to restore normal hearing sensitivity. The importance of realistic expectations, especially in the presence of background noise, was emphasized. The need for daily, consistent usage (8-12 hours per day) for proper device adjustment, as well as the importance of self-advocacy and practicing effective communication strategies was outlined.     Effective communication strategies include:  1.) Maintaining face-to-face communication, allowing for speechreading of facial expressions, lips, and gestures.  2.) Reducing background noise and distance between communication partners.  3.) Having communication partners reduce their rate of speech when appropriate.  4.) Beginning conversation by getting communication partner's attention.  5.) Asking for rephrasing of missed aspects of conversation rather than asking for repetition.    RECOMMENDATIONS:  The patient demonstrated understanding of all the topics discussed. The patientis to follow-up in 2-3 weeks for a hearing aid check within the trial period as scheduled.     Nicola Peacock., [facility]-A  Clinical Audiologist   [facility]  [address]  [place] [address]

## 2024-10-18 ENCOUNTER — OFFICE VISIT (OUTPATIENT)
Dept: FAMILY MEDICINE CLINIC | Facility: MEDICAL CENTER | Age: 84
End: 2024-10-18
Payer: COMMERCIAL

## 2024-10-18 VITALS
HEIGHT: 70 IN | RESPIRATION RATE: 18 BRPM | WEIGHT: 157.4 LBS | DIASTOLIC BLOOD PRESSURE: 76 MMHG | HEART RATE: 85 BPM | TEMPERATURE: 98.4 F | SYSTOLIC BLOOD PRESSURE: 120 MMHG | BODY MASS INDEX: 22.54 KG/M2 | OXYGEN SATURATION: 99 %

## 2024-10-18 DIAGNOSIS — H61.21 IMPACTED CERUMEN OF RIGHT EAR: Primary | ICD-10-CM

## 2024-10-18 DIAGNOSIS — Z23 ENCOUNTER FOR IMMUNIZATION: ICD-10-CM

## 2024-10-18 PROCEDURE — 90662 IIV NO PRSV INCREASED AG IM: CPT

## 2024-10-18 PROCEDURE — G0008 ADMIN INFLUENZA VIRUS VAC: HCPCS

## 2024-10-18 PROCEDURE — 99213 OFFICE O/P EST LOW 20 MIN: CPT | Performed by: STUDENT IN AN ORGANIZED HEALTH CARE EDUCATION/TRAINING PROGRAM

## 2024-10-18 PROCEDURE — 69210 REMOVE IMPACTED EAR WAX UNI: CPT | Performed by: STUDENT IN AN ORGANIZED HEALTH CARE EDUCATION/TRAINING PROGRAM

## 2024-10-18 NOTE — PROGRESS NOTES
[facility] - Clinic Note  Claudia Luciano DO, 10/18/24     Guero Hinds MRN: [id number] : 1940 Age: 84 y.o.     Assessment/Plan     1. Impacted cerumen of right ear    -Successful cerumen disimpaction today right without complication  -Patient is in receipt of his hearing aids    2. Encounter for immunization    -Counseled about influenza vaccine, would like to receive today  - influenza vaccine, high-dose, PF 0.5 mL (Fluzone High Dose)      Guero Hinds acknowledged understanding of treatment plan, all questions answered.    Subjective      Guero Hinds is a 84 y.o. male who returns to office for ear flush right.  Patient did use Debrox drops x 4 days.  Patient would also like to receive his influenza vaccine.      The following portions of the patient's history were reviewed and updated as appropriate: allergies, current medications, past family history, past medical history, past social history, past surgical history and problem list.     Past Medical History:   Diagnosis Date    Allergic     Basal cell carcinoma     Cancer (HCC) unknown    Disease of thyroid gland     Visual impairment unknown       No Known Allergies    Past Surgical History:   Procedure Laterality Date    HERNIA REPAIR          OTHER SURGICAL HISTORY      ohs icrographic Surgery Face ; right upper lip       Family History   Problem Relation Age of Onset    Heart attack Father     Multiple sclerosis Sister     Coronary artery disease Brother        Social History     Socioeconomic History    Marital status: /Civil Union     Spouse name: None    Number of children: None    Years of education: None    Highest education level: None   Occupational History    None   Tobacco Use    Smoking status: Never    Smokeless tobacco: Never    Tobacco comments:     smoked for 1 year at age 17   Vaping Use    Vaping status: Never Used   Substance and Sexual Activity    Alcohol use: Not Currently     Alcohol/week:  0.0 standard drinks of alcohol     Comment: social    Drug use: No    Sexual activity: Not Currently   Other Topics Concern    None   Social History Narrative    Caffeine use     Social Determinants of Health     Financial Resource Strain: Low Risk  (11/22/2022)    Overall Financial Resource Strain (CARDIA)     Difficulty of Paying Living Expenses: Not very hard   Food Insecurity: Not on file   Transportation Needs: No Transportation Needs (11/29/2023)    PRAPARE - Transportation     Lack of Transportation (Medical): No     Lack of Transportation (Non-Medical): No   Physical Activity: Not on file   Stress: Not on file   Social Connections: Not on file   Intimate Partner Violence: Not on file   Housing Stability: Not on file       Current Outpatient Medications   Medication Sig Dispense Refill    Alpha Lipoic Acid 200 MG CAPS       apixaban (Eliquis) 5 mg Take 1 tablet (5 mg total) by mouth 2 (two) times a day Lot [id number] exp 9/24. 180 tablet 3    Ascorbic Acid (Vitamin C) 500 MG CAPS       B Complex Vitamins (VITAMIN B COMPLEX PO) Take by mouth      Coenzyme Q10 (CoQ10) 100 MG CAPS       metoprolol succinate (TOPROL-XL) 25 mg 24 hr tablet Take 1 tablet (25 mg total) by mouth daily 100 tablet 3    Multiple Vitamin (DAILY VALUE MULTIVITAMIN) TABS Take by mouth      Omega 3 1200 MG CAPS       SELENIUM PO 15 mg      Sodium Fluoride 1.1 % PSTE PreviDent 5000 Booster 1.1 % Dental Paste; 100.00; 0; 21-Apr-2012; 13-Dec-2010; Active      Vitamin E 400 units TABS       Zinc 30 MG TABS       carbamide peroxide (DEBROX) 6.5 % otic solution Administer 5 drops to the right ear 2 (two) times a day for 4 days 15 mL 0    Saw Palmetto, Serenoa repens, 450 MG CAPS Take by mouth (Patient not taking: Reported on 1/15/2024)       No current facility-administered medications for this visit.       Review of Systems     As noted in HPI    Objective      /76 (BP Location: Left arm, Patient Position: Sitting, Cuff Size: Standard)    "Pulse 85   Temp 98.4 °F (36.9 °C) (Temporal)   Resp 18   Ht 5' 10\" (1.778 m)   Wt 71.4 kg (157 lb 6.4 oz)   SpO2 99%   BMI 22.58 kg/m²     Physical Exam  Vitals reviewed.   Constitutional:       General: He is not in acute distress.     Appearance: Normal appearance.   HENT:      Right Ear: There is impacted cerumen.   Eyes:      Conjunctiva/sclera: Conjunctivae normal.   Pulmonary:      Effort: Pulmonary effort is normal.   Skin:     General: Skin is warm and dry.   Neurological:      Mental Status: He is alert and oriented to person, place, and time.   Psychiatric:         Mood and Affect: Mood normal.         Behavior: Behavior normal.         Thought Content: Thought content normal.         Ear cerumen removal    Date/Time: 10/18/2024 10:20 AM    Performed by: Claudia Luciano DO  Authorized by: Claudia Luciano DO  Universal Protocol:  procedure performed by consultantConsent: Verbal consent obtained.  Consent given by: patient  Time out: Immediately prior to procedure a \"time out\" was called to verify the correct patient, procedure, equipment, support staff and site/side marked as required.  Patient understanding: patient states understanding of the procedure being performed  Patient consent: the patient's understanding of the procedure matches consent given  Procedure consent: procedure consent matches procedure scheduled  Patient identity confirmed: verbally with patient    Patient location:  Clinic  Procedure details:     Local anesthetic:  None    Location:  R ear    Procedure type: irrigation with instrumentation      Instrumentation: curette      Approach:  External  Post-procedure details:     Complication:  None    Patient tolerance of procedure:  Tolerated well, no immediate complications        Some portions of this record may have been generated with voice recognition software. There may be translation, syntax, or grammatical errors. Occasional wrong word or \"sound-a-like\" substitutions may have " occurred due to the inherent limitations of the voice recognition software. Read the chart carefully and recognize, using context, where substations may have occurred. If you have any questions, please contact the dictating provider for clarification or correction, as needed.

## 2024-10-22 ENCOUNTER — OFFICE VISIT (OUTPATIENT)
Dept: AUDIOLOGY | Age: 84
End: 2024-10-22

## 2024-10-22 DIAGNOSIS — H90.3 SENSORY HEARING LOSS, BILATERAL: Primary | ICD-10-CM

## 2024-10-22 NOTE — PROGRESS NOTES
Hearing Aid Visit:    Name:  Guero Hinds  :  1940  Age:  84 y.o.  MRN:  [id number]  Date of Evaluation: 10/22/24     HISTORY:    Guero Hinds was seen today (10/22/2024) for a(n) in-warranty hearing aid check of his bilateral hearing aids. Today, Guero reports overall benefit from the hearing aids.    DEVICE INFORMATION:       Left Device Right Device    Hearing Aid Make: OtApsmart  OtApsmart    Hearing Aid Model: Real 2 miniRITE-R Real 2 miniRITE-R   Serial Number: [id number]   Repair Warranty Date: 2027   Loss/Damage Warranty Status: Active  Active         Length/Output 3X85 3X85   Wax System: Pro Wax miniFIT Pro Wax miniFIT   Dome Size/Style: 10 mm DB 10 mm DB   Battery: Lithium-ion Rechargeable Lithium-ion Rechargeable       Earmold Serial Number: N/A N/A   Earmold Warranty Date:  N/A N/A    Serial Number:  [id number]    Warranty Date:  2027     Accessories: N/A          ACTION/ADJUSTMENTS:    Visual inspection of the device(s) revealed no noticeable damage or defects.     A listening check revealed good sound quality from the device(s).    The hearing aids were cleaned and checked. The patients wax filters and domes were replaced bilaterally. Cleaning and maintenance was reviewed.    No adjustments were made at this time.  Per patient request.      RECOMMENDATIONS:     Follow up in 6 months      Andra Peacock, [facility]-A  Clinical Audiologist  [facility]  [facility] [address] [address]  [place] [address]

## 2024-12-02 ENCOUNTER — TELEPHONE (OUTPATIENT)
Age: 84
End: 2024-12-02

## 2024-12-02 NOTE — TELEPHONE ENCOUNTER
Caller: Patient       Doctor: Dr. Mccray     Reason for call: Pt called & stated that currently he is in the [facility] hole for Eliquis & would like to know if he is able to pickup Elquis samples 5 MG. Please call pt and advise.     Call back#: [phone]

## 2024-12-05 ENCOUNTER — OFFICE VISIT (OUTPATIENT)
Dept: FAMILY MEDICINE CLINIC | Facility: MEDICAL CENTER | Age: 84
End: 2024-12-05
Payer: COMMERCIAL

## 2024-12-05 ENCOUNTER — APPOINTMENT (OUTPATIENT)
Dept: LAB | Facility: MEDICAL CENTER | Age: 84
End: 2024-12-05
Payer: COMMERCIAL

## 2024-12-05 VITALS
WEIGHT: 156.4 LBS | RESPIRATION RATE: 18 BRPM | BODY MASS INDEX: 22.39 KG/M2 | TEMPERATURE: 97.5 F | OXYGEN SATURATION: 99 % | HEART RATE: 77 BPM | HEIGHT: 70 IN | SYSTOLIC BLOOD PRESSURE: 100 MMHG | DIASTOLIC BLOOD PRESSURE: 62 MMHG

## 2024-12-05 DIAGNOSIS — E03.9 ACQUIRED HYPOTHYROIDISM: ICD-10-CM

## 2024-12-05 DIAGNOSIS — I48.19 PERSISTENT ATRIAL FIBRILLATION (HCC): ICD-10-CM

## 2024-12-05 DIAGNOSIS — I34.0 NONRHEUMATIC MITRAL VALVE REGURGITATION: ICD-10-CM

## 2024-12-05 DIAGNOSIS — E03.9 ACQUIRED HYPOTHYROIDISM: Primary | ICD-10-CM

## 2024-12-05 DIAGNOSIS — N40.1 BENIGN PROSTATIC HYPERPLASIA (BPH) WITH URINARY URGE INCONTINENCE: ICD-10-CM

## 2024-12-05 DIAGNOSIS — Z00.00 MEDICARE ANNUAL WELLNESS VISIT, SUBSEQUENT: ICD-10-CM

## 2024-12-05 DIAGNOSIS — N39.41 BENIGN PROSTATIC HYPERPLASIA (BPH) WITH URINARY URGE INCONTINENCE: ICD-10-CM

## 2024-12-05 DIAGNOSIS — J30.1 SEASONAL ALLERGIC RHINITIS DUE TO POLLEN: ICD-10-CM

## 2024-12-05 LAB
ALBUMIN SERPL BCG-MCNC: 4.1 G/DL (ref 3.5–5)
ALP SERPL-CCNC: 55 U/L (ref 34–104)
ALT SERPL W P-5'-P-CCNC: 20 U/L (ref 7–52)
ANION GAP SERPL CALCULATED.3IONS-SCNC: 7 MMOL/L (ref 4–13)
AST SERPL W P-5'-P-CCNC: 26 U/L (ref 13–39)
BASOPHILS # BLD AUTO: 0.06 THOUSANDS/ÂΜL (ref 0–0.1)
BASOPHILS NFR BLD AUTO: 1 % (ref 0–1)
BILIRUB SERPL-MCNC: 0.65 MG/DL (ref 0.2–1)
BUN SERPL-MCNC: 26 MG/DL (ref 5–25)
CALCIUM SERPL-MCNC: 9.5 MG/DL (ref 8.4–10.2)
CHLORIDE SERPL-SCNC: 102 MMOL/L (ref 96–108)
CHOLEST SERPL-MCNC: 178 MG/DL (ref ?–200)
CO2 SERPL-SCNC: 30 MMOL/L (ref 21–32)
CREAT SERPL-MCNC: 0.58 MG/DL (ref 0.6–1.3)
EOSINOPHIL # BLD AUTO: 0.14 THOUSAND/ÂΜL (ref 0–0.61)
EOSINOPHIL NFR BLD AUTO: 2 % (ref 0–6)
ERYTHROCYTE [DISTWIDTH] IN BLOOD BY AUTOMATED COUNT: 13.6 % (ref 11.6–15.1)
GFR SERPL CREATININE-BSD FRML MDRD: 94 ML/MIN/1.73SQ M
GLUCOSE P FAST SERPL-MCNC: 91 MG/DL (ref 65–99)
HCT VFR BLD AUTO: 46.9 % (ref 36.5–49.3)
HDLC SERPL-MCNC: 68 MG/DL
HGB BLD-MCNC: 14.9 G/DL (ref 12–17)
IMM GRANULOCYTES # BLD AUTO: 0.02 THOUSAND/UL (ref 0–0.2)
IMM GRANULOCYTES NFR BLD AUTO: 0 % (ref 0–2)
LDLC SERPL CALC-MCNC: 97 MG/DL (ref 0–100)
LYMPHOCYTES # BLD AUTO: 0.89 THOUSANDS/ÂΜL (ref 0.6–4.47)
LYMPHOCYTES NFR BLD AUTO: 16 % (ref 14–44)
MCH RBC QN AUTO: 29.5 PG (ref 26.8–34.3)
MCHC RBC AUTO-ENTMCNC: 31.8 G/DL (ref 31.4–37.4)
MCV RBC AUTO: 93 FL (ref 82–98)
MONOCYTES # BLD AUTO: 0.69 THOUSAND/ÂΜL (ref 0.17–1.22)
MONOCYTES NFR BLD AUTO: 12 % (ref 4–12)
NEUTROPHILS # BLD AUTO: 3.94 THOUSANDS/ÂΜL (ref 1.85–7.62)
NEUTS SEG NFR BLD AUTO: 69 % (ref 43–75)
NONHDLC SERPL-MCNC: 110 MG/DL
NRBC BLD AUTO-RTO: 0 /100 WBCS
PLATELET # BLD AUTO: 233 THOUSANDS/UL (ref 149–390)
PMV BLD AUTO: 10.1 FL (ref 8.9–12.7)
POTASSIUM SERPL-SCNC: 4.2 MMOL/L (ref 3.5–5.3)
PROT SERPL-MCNC: 7.2 G/DL (ref 6.4–8.4)
RBC # BLD AUTO: 5.05 MILLION/UL (ref 3.88–5.62)
SODIUM SERPL-SCNC: 139 MMOL/L (ref 135–147)
TRIGL SERPL-MCNC: 63 MG/DL (ref ?–150)
TSH SERPL DL<=0.05 MIU/L-ACNC: 6.83 UIU/ML (ref 0.45–4.5)
WBC # BLD AUTO: 5.74 THOUSAND/UL (ref 4.31–10.16)

## 2024-12-05 PROCEDURE — 99214 OFFICE O/P EST MOD 30 MIN: CPT | Performed by: INTERNAL MEDICINE

## 2024-12-05 PROCEDURE — G0439 PPPS, SUBSEQ VISIT: HCPCS | Performed by: INTERNAL MEDICINE

## 2024-12-05 PROCEDURE — 84443 ASSAY THYROID STIM HORMONE: CPT

## 2024-12-05 PROCEDURE — 80061 LIPID PANEL: CPT

## 2024-12-05 PROCEDURE — 36415 COLL VENOUS BLD VENIPUNCTURE: CPT

## 2024-12-05 PROCEDURE — 80053 COMPREHEN METABOLIC PANEL: CPT

## 2024-12-05 PROCEDURE — 85025 COMPLETE CBC W/AUTO DIFF WBC: CPT

## 2024-12-05 NOTE — ASSESSMENT & PLAN NOTE
His TSH has been high for a few years but he did not want to take any medication as he is asymptomatic.  Will recheck the TSH and if it is more than 10, he may need to take medication.  He agrees with the plan  Orders:    CBC and differential; Future    Comprehensive metabolic panel; Future    Lipid panel; Future    TSH, 3rd generation; Future

## 2024-12-05 NOTE — PATIENT INSTRUCTIONS
Medicare Preventive Visit Patient Instructions  Thank you for completing your Welcome to Medicare Visit or Medicare Annual Wellness Visit today. Your next wellness visit will be due in one year (12/6/2025).  The screening/preventive services that you may require over the next 5-10 years are detailed below. Some tests may not apply to you based off risk factors and/or age. Screening tests ordered at today's visit but not completed yet may show as past due. Also, please note that scanned in results may not display below.  Preventive Screenings:  Service Recommendations Previous Testing/Comments   Colorectal Cancer Screening  Colonoscopy    Fecal Occult Blood Test (FOBT)/Fecal Immunochemical Test (FIT)  Fecal DNA/Cologuard Test  Flexible Sigmoidoscopy Age: 45-75 years old   Colonoscopy: every 10 years (May be performed more frequently if at higher risk)  OR  FOBT/FIT: every 1 year  OR  Cologuard: every 3 years  OR  Sigmoidoscopy: every 5 years  Screening may be recommended earlier than age 45 if at higher risk for colorectal cancer. Also, an individualized decision between you and your healthcare provider will decide whether screening between the ages of 76-85 would be appropriate. Colonoscopy: 08/08/2007  FOBT/FIT: Not on file  Cologuard: Not on file  Sigmoidoscopy: Not on file          Prostate Cancer Screening Individualized decision between patient and health care provider in men between ages of 55-69   Medicare will cover every 12 months beginning on the day after your 50th birthday PSA: 1.6 ng/mL     Screening Not Indicated     Hepatitis C Screening Once for adults born between 1945 and 1965  More frequently in patients at high risk for Hepatitis C Hep C Antibody: Not on file        Diabetes Screening 1-2 times per year if you're at risk for diabetes or have pre-diabetes Fasting glucose: 86 mg/dL (12/4/2023)  A1C: No results in last 5 years (No results in last 5 years)      Cholesterol Screening Once every 5  years if you don't have a lipid disorder. May order more often based on risk factors. Lipid panel: 12/04/2023  Screening Current      Other Preventive Screenings Covered by Medicare:  Abdominal Aortic Aneurysm (AAA) Screening: covered once if your at risk. You're considered to be at risk if you have a family history of AAA or a male between the age of 65-75 who smoking at least 100 cigarettes in your lifetime.  Lung Cancer Screening: covers low dose CT scan once per year if you meet all of the following conditions: (1) Age 55-77; (2) No signs or symptoms of lung cancer; (3) Current smoker or have quit smoking within the last 15 years; (4) You have a tobacco smoking history of at least 20 pack years (packs per day x number of years you smoked); (5) You get a written order from a healthcare provider.  Glaucoma Screening: covered annually if you're considered high risk: (1) You have diabetes OR (2) Family history of glaucoma OR (3)  aged 50 and older OR (4)  American aged 65 and older  Osteoporosis Screening: covered every 2 years if you meet one of the following conditions: (1) Have a vertebral abnormality; (2) On glucocorticoid therapy for more than 3 months; (3) Have primary hyperparathyroidism; (4) On osteoporosis medications and need to assess response to drug therapy.  HIV Screening: covered annually if you're between the age of 15-65. Also covered annually if you are younger than 15 and older than 65 with risk factors for HIV infection. For pregnant patients, it is covered up to 3 times per pregnancy.    Immunizations:  Immunization Recommendations   Influenza Vaccine Annual influenza vaccination during flu season is recommended for all persons aged >= 6 months who do not have contraindications   Pneumococcal Vaccine   * Pneumococcal conjugate vaccine = PCV13 (Prevnar 13), PCV15 (Vaxneuvance), PCV20 (Prevnar 20)  * Pneumococcal polysaccharide vaccine = PPSV23 (Pneumovax) Adults 19-63 yo  with certain risk factors or if 65+ yo  If never received any pneumonia vaccine: recommend Prevnar 20 (PCV20)  Give PCV20 if previously received 1 dose of PCV13 or PPSV23   Hepatitis B Vaccine 3 dose series if at intermediate or high risk (ex: diabetes, end stage renal disease, liver disease)   Respiratory syncytial virus (RSV) Vaccine - COVERED BY MEDICARE PART D  * RSVPreF3 (Arexvy) CDC recommends that adults 60 years of age and older may receive a single dose of RSV vaccine using shared clinical decision-making (SCDM)   Tetanus (Td) Vaccine - COST NOT COVERED BY MEDICARE PART B Following completion of primary series, a booster dose should be given every 10 years to maintain immunity against tetanus. Td may also be given as tetanus wound prophylaxis.   Tdap Vaccine - COST NOT COVERED BY MEDICARE PART B Recommended at least once for all adults. For pregnant patients, recommended with each pregnancy.   Shingles Vaccine (Shingrix) - COST NOT COVERED BY MEDICARE PART B  2 shot series recommended in those 19 years and older who have or will have weakened immune systems or those 50 years and older     Health Maintenance Due:  There are no preventive care reminders to display for this patient.  Immunizations Due:  There are no preventive care reminders to display for this patient.  Advance Directives   What are advance directives?  Advance directives are legal documents that state your wishes and plans for medical care. These plans are made ahead of time in case you lose your ability to make decisions for yourself. Advance directives can apply to any medical decision, such as the treatments you want, and if you want to donate organs.   What are the types of advance directives?  There are many types of advance directives, and each state has rules about how to use them. You may choose a combination of any of the following:  Living will:  This is a written record of the treatment you want. You can also choose which  treatments you do not want, which to limit, and which to stop at a certain time. This includes surgery, medicine, IV fluid, and tube feedings.   Durable power of  for healthcare (DPAHC):  This is a written record that states who you want to make healthcare choices for you when you are unable to make them for yourself. This person, called a proxy, is usually a family member or a friend. You may choose more than 1 proxy.  Do not resuscitate (DNR) order:  A DNR order is used in case your heart stops beating or you stop breathing. It is a request not to have certain forms of treatment, such as CPR. A DNR order may be included in other types of advance directives.  Medical directive:  This covers the care that you want if you are in a coma, near death, or unable to make decisions for yourself. You can list the treatments you want for each condition. Treatment may include pain medicine, surgery, blood transfusions, dialysis, IV or tube feedings, and a ventilator (breathing machine).  Values history:  This document has questions about your views, beliefs, and how you feel and think about life. This information can help others choose the care that you would choose.  Why are advance directives important?  An advance directive helps you control your care. Although spoken wishes may be used, it is better to have your wishes written down. Spoken wishes can be misunderstood, or not followed. Treatments may be given even if you do not want them. An advance directive may make it easier for your family to make difficult choices about your care.       © Copyright [employer] 2018 Information is for End User's use only and may not be sold, redistributed or otherwise used for commercial purposes. All illustrations and images included in CareNotes® are the copyrighted property of [employer] or [employer]     Yes

## 2024-12-05 NOTE — PROGRESS NOTES
Name: Guero EDDY Guzman      : 1940      MRN: [id number]  Encounter Provider: Sea Ying MD  Encounter Date: 2024   Encounter department: [facility]    Assessment & Plan  Acquired hypothyroidism  His TSH has been high for a few years but he did not want to take any medication as he is asymptomatic.  Will recheck the TSH and if it is more than 10, he may need to take medication.  He agrees with the plan  Orders:    CBC and differential; Future    Comprehensive metabolic panel; Future    Lipid panel; Future    TSH, 3rd generation; Future    Persistent atrial fibrillation (HCC)  Continue Eliquis and metoprolol, follow-up with cardiology       Seasonal allergic rhinitis due to pollen  Doing well, takes medication as needed       Benign prostatic hyperplasia (BPH) with urinary urge incontinence  Does not want to take any medication.  Did see the urologist in the past.       Nonrheumatic mitral valve regurgitation  Stable, follow-up with cardiology       Medicare annual wellness visit, subsequent            Preventive health issues were discussed with patient, and age appropriate screening tests were ordered as noted in patient's After Visit Summary. Personalized health advice and appropriate referrals for health education or preventive services given if needed, as noted in patient's After Visit Summary.    History of Present Illness     HPI  This is a patient of Dr. Mora who needs to establish.  He is only taking 2 medications Eliquis and metoprolol for the persistent atrial fibrillation and has been doing well with them.  He also follows up with cardiology for the atrial fibrillation and the mitral valve regurgitation.  Has BPH but does not have any significant symptoms and does not want to take any medication  His TSH has been on the higher side but he is asymptomatic      Patient Care Team:  Sea Ying MD as PCP - General (Internal Medicine)  Guero Mcallister MD  [facility]  MD Dirk Mcdowell MD    Review of Systems   Constitutional:  Negative for chills, diaphoresis, fatigue and fever.   HENT:  Negative for congestion, ear discharge, ear pain, hearing loss, postnasal drip, rhinorrhea, sinus pressure, sinus pain, sneezing, sore throat and voice change.    Eyes:  Negative for pain, discharge, redness and visual disturbance.   Respiratory:  Negative for cough, chest tightness, shortness of breath and wheezing.    Cardiovascular:  Negative for chest pain, palpitations and leg swelling.   Gastrointestinal:  Negative for abdominal distention, abdominal pain, blood in stool, constipation, diarrhea, nausea and vomiting.   Endocrine: Negative for cold intolerance, heat intolerance, polydipsia, polyphagia and polyuria.   Genitourinary:  Negative for dysuria, flank pain, frequency, hematuria and urgency.   Musculoskeletal:  Negative for arthralgias, back pain, gait problem, joint swelling, myalgias, neck pain and neck stiffness.   Skin:  Negative for rash.   Neurological:  Negative for dizziness, tremors, syncope, facial asymmetry, speech difficulty, weakness, light-headedness, numbness and headaches.   Hematological:  Does not bruise/bleed easily.   Psychiatric/Behavioral:  Negative for behavioral problems, confusion and sleep disturbance. The patient is not nervous/anxious.      Medical History Reviewed by provider this encounter:  Tobacco  Allergies  Meds  Problems  Med Hx  Surg Hx  Fam Hx       Annual Wellness Visit Questionnaire   Guero is here for his Subsequent Wellness visit.     Health Risk Assessment:   Patient rates overall health as excellent. Patient feels that their physical health rating is same. Patient is satisfied with their life. Eyesight was rated as same. Hearing was rated as same. Patient feels that their emotional and mental health rating is same. Patients states they are never, rarely angry. Patient states they are sometimes unusually tired/fatigued. Pain  experienced in the last 7 days has been some. Patient's pain rating has been 3/10. Patient states that he has experienced no weight loss or gain in last 6 months.     Depression Screening:   PHQ-2 Score: 0      Fall Risk Screening:   In the past year, patient has experienced: no history of falling in past year      Home Safety:  Patient does not have trouble with stairs inside or outside of their home. Patient has working smoke alarms and has working carbon monoxide detector. Home safety hazards include: none.     Nutrition:   Current diet is Regular and Frequent junk food.     Medications:   Patient is currently taking over-the-counter supplements. OTC medications include: see medication list. Patient is able to manage medications.     Activities of Daily Living (ADLs)/Instrumental Activities of Daily Living (IADLs):   Walk and transfer into and out of bed and chair?: Yes  Dress and groom yourself?: Yes    Bathe or shower yourself?: Yes    Feed yourself? Yes  Do your laundry/housekeeping?: Yes  Manage your money, pay your bills and track your expenses?: Yes  Make your own meals?: Yes    Do your own shopping?: Yes    Previous Hospitalizations:   Any hospitalizations or ED visits within the last 12 months?: No      Advance Care Planning:   Living will: Yes    Durable POA for healthcare: Yes    Advanced directive: Yes      PREVENTIVE SCREENINGS      Cardiovascular Screening:    General: Screening Current      Prostate Cancer Screening:    General: Screening Not Indicated      Lung Cancer Screening:     General: Screening Not Indicated    Screening, Brief Intervention, and Referral to Treatment (SBIRT)    Screening  Typical number of drinks in a day: 0  Typical number of drinks in a week: 0  Interpretation: Low risk drinking behavior.    AUDIT-C Screenin) How often did you have a drink containing alcohol in the past year? never  2) How many drinks did you have on a typical day when you were drinking in the past  "year? 0  3) How often did you have 6 or more drinks on one occasion in the past year? never    AUDIT-C Score: 0  Interpretation: Score 0-3 (male): Negative screen for alcohol misuse    Single Item Drug Screening:  How often have you used an illegal drug (including marijuana) or a prescription medication for non-medical reasons in the past year? never    Single Item Drug Screen Score: 0  Interpretation: Negative screen for possible drug use disorder    Social Drivers of Health     Financial Resource Strain: Low Risk  (11/22/2022)    Overall Financial Resource Strain (CARDIA)     Difficulty of Paying Living Expenses: Not very hard   Food Insecurity: No Food Insecurity (12/5/2024)    Hunger Vital Sign     Worried About Running Out of Food in the Last Year: Never true     Ran Out of Food in the Last Year: Never true   Transportation Needs: No Transportation Needs (12/5/2024)    PRAPARE - Transportation     Lack of Transportation (Medical): No     Lack of Transportation (Non-Medical): No   Housing Stability: Low Risk  (12/5/2024)    Housing Stability Vital Sign     Unable to Pay for Housing in the Last Year: No     Number of Times Moved in the Last Year: 0     Homeless in the Last Year: No   Utilities: Not At Risk (12/5/2024)    [facility] Utilities     Threatened with loss of utilities: No     No results found.    Objective   /62 (BP Location: Left arm, Patient Position: Sitting, Cuff Size: Large)   Pulse 77   Temp 97.5 °F (36.4 °C) (Temporal)   Resp 18   Ht 5' 10\" (1.778 m)   Wt 70.9 kg (156 lb 6.4 oz)   SpO2 99%   BMI 22.44 kg/m²     Physical Exam  Constitutional:       General: He is not in acute distress.     Appearance: He is well-developed. He is not diaphoretic.   HENT:      Head: Normocephalic and atraumatic.      Right Ear: External ear normal.      Left Ear: External ear normal.      Nose: Nose normal.   Eyes:      General: No scleral icterus.        Right eye: No discharge.         Left eye: No " discharge.      Conjunctiva/sclera: Conjunctivae normal.   Cardiovascular:      Rate and Rhythm: Normal rate and regular rhythm.      Heart sounds: Normal heart sounds. No murmur heard.     No friction rub. No gallop.   Pulmonary:      Effort: Pulmonary effort is normal. No respiratory distress.      Breath sounds: Normal breath sounds. No wheezing or rales.   Abdominal:      General: Bowel sounds are normal. There is no distension.      Palpations: Abdomen is soft.      Tenderness: There is no abdominal tenderness. There is no guarding or rebound.   Musculoskeletal:         General: No tenderness.   Skin:     General: Skin is warm and dry.      Findings: No erythema or rash.   Neurological:      Mental Status: He is alert and oriented to person, place, and time.      Cranial Nerves: No cranial nerve deficit.      Sensory: No sensory deficit.      Motor: No abnormal muscle tone.   Psychiatric:         Behavior: Behavior normal.

## 2024-12-09 ENCOUNTER — RESULTS FOLLOW-UP (OUTPATIENT)
Dept: FAMILY MEDICINE CLINIC | Facility: MEDICAL CENTER | Age: 84
End: 2024-12-09

## 2025-01-28 ENCOUNTER — OFFICE VISIT (OUTPATIENT)
Dept: CARDIOLOGY CLINIC | Facility: MEDICAL CENTER | Age: 85
End: 2025-01-28
Payer: COMMERCIAL

## 2025-01-28 VITALS
OXYGEN SATURATION: 100 % | WEIGHT: 154 LBS | BODY MASS INDEX: 22.05 KG/M2 | HEART RATE: 82 BPM | DIASTOLIC BLOOD PRESSURE: 78 MMHG | HEIGHT: 70 IN | SYSTOLIC BLOOD PRESSURE: 108 MMHG

## 2025-01-28 DIAGNOSIS — I34.0 NONRHEUMATIC MITRAL VALVE REGURGITATION: ICD-10-CM

## 2025-01-28 DIAGNOSIS — I48.19 PERSISTENT ATRIAL FIBRILLATION (HCC): Primary | ICD-10-CM

## 2025-01-28 PROCEDURE — 93000 ELECTROCARDIOGRAM COMPLETE: CPT | Performed by: INTERNAL MEDICINE

## 2025-01-28 PROCEDURE — 99214 OFFICE O/P EST MOD 30 MIN: CPT | Performed by: INTERNAL MEDICINE

## 2025-01-28 NOTE — PROGRESS NOTES
Cardiology   Guero Hinds 84 y.o. male MRN: [id number]        Impression:  1. Persistent atrial fibrillation - asymptomatic.  Started on Eliquis.  Asymptomatic.     2. Mitral regurgitation - mild-mod MR 7/23     Recommendations:  1. Continue current medications.  2. Follow up in 6 months.        HPI: Guero Hinds is a 84 y.o. year old male with paroxysmal atrial fibrillation who presents for follow up. Asymptomatic.  No chest pain, shortness of breath, or palpitations.  Echo 12/21 demonstrated normal LV systolic function, mild LVH, and moderate mitral regurgitation.    Repeat echo 7/23 - mild-mod MR.         Review of Systems   Constitutional: Negative.    HENT: Negative.     Eyes: Negative.    Respiratory:  Negative for chest tightness and shortness of breath.    Cardiovascular:  Negative for chest pain, palpitations and leg swelling.   Gastrointestinal: Negative.    Endocrine: Negative.    Genitourinary: Negative.    Musculoskeletal: Negative.    Skin: Negative.    Allergic/Immunologic: Negative.    Neurological: Negative.    Hematological: Negative.    Psychiatric/Behavioral: Negative.     All other systems reviewed and are negative.        Past Medical History:   Diagnosis Date    Allergic 1956    Basal cell carcinoma     Cancer (HCC) unknown    Disease of thyroid gland     Visual impairment unknown     Past Surgical History:   Procedure Laterality Date    HERNIA REPAIR      1970's    OTHER SURGICAL HISTORY      ohs icrographic Surgery Face ; right upper lip     Social History     Substance and Sexual Activity   Alcohol Use Not Currently    Alcohol/week: 0.0 standard drinks of alcohol    Comment: social     Social History     Substance and Sexual Activity   Drug Use No     Social History     Tobacco Use   Smoking Status Never   Smokeless Tobacco Never   Tobacco Comments    smoked for 1 year at age 17     Family History   Problem Relation Age of Onset    Heart attack Father     Multiple sclerosis Sister      Coronary artery disease Brother        Allergies:  No Known Allergies    Medications:     Current Outpatient Medications:     Alpha Lipoic Acid 200 MG CAPS, , Disp: , Rfl:     apixaban (Eliquis) 5 mg, Take 1 tablet (5 mg total) by mouth 2 (two) times a day Lot [id number] exp 9/24., Disp: 180 tablet, Rfl: 3    Ascorbic Acid (Vitamin C) 500 MG CAPS, , Disp: , Rfl:     B Complex Vitamins (VITAMIN B COMPLEX PO), Take by mouth, Disp: , Rfl:     Coenzyme Q10 (CoQ10) 100 MG CAPS, , Disp: , Rfl:     metoprolol succinate (TOPROL-XL) 25 mg 24 hr tablet, Take 1 tablet (25 mg total) by mouth daily, Disp: 100 tablet, Rfl: 3    Multiple Vitamin (DAILY VALUE MULTIVITAMIN) TABS, Take by mouth, Disp: , Rfl:     Omega 3 1200 MG CAPS, , Disp: , Rfl:     SELENIUM PO, 15 mg, Disp: , Rfl:     Sodium Fluoride 1.1 % PSTE, PreviDent 5000 Booster 1.1 % Dental Paste; 100.00; 0; 21-Apr-2012; 13-Dec-2010; Active, Disp: , Rfl:     Vitamin E 400 units TABS, , Disp: , Rfl:     Zinc 30 MG TABS, , Disp: , Rfl:     carbamide peroxide (DEBROX) 6.5 % otic solution, Administer 5 drops to the right ear 2 (two) times a day for 4 days, Disp: 15 mL, Rfl: 0      Wt Readings from Last 3 Encounters:   01/28/25 69.9 kg (154 lb)   12/05/24 70.9 kg (156 lb 6.4 oz)   10/18/24 71.4 kg (157 lb 6.4 oz)     Temp Readings from Last 3 Encounters:   12/05/24 97.5 °F (36.4 °C) (Temporal)   10/18/24 98.4 °F (36.9 °C) (Temporal)   10/10/24 97.5 °F (36.4 °C) (Temporal)     BP Readings from Last 3 Encounters:   01/28/25 108/78   12/05/24 100/62   10/18/24 120/76     Pulse Readings from Last 3 Encounters:   01/28/25 82   12/05/24 77   10/18/24 85         Physical Exam  HENT:      Head: Atraumatic.      Mouth/Throat:      Mouth: Mucous membranes are moist.   Eyes:      Extraocular Movements: Extraocular movements intact.   Cardiovascular:      Rate and Rhythm: Normal rate. Rhythm irregularly irregular.      Heart sounds: Normal heart sounds.   Pulmonary:      Effort: Pulmonary  effort is normal.      Breath sounds: Normal breath sounds.   Abdominal:      General: Abdomen is flat.   Musculoskeletal:         General: Normal range of motion.      Cervical back: Normal range of motion.   Skin:     General: Skin is warm.   Neurological:      General: No focal deficit present.      Mental Status: He is alert and oriented to person, place, and time.           Laboratory Studies:  CMP:  Lab Results   Component Value Date     07/29/2015    K 4.2 12/05/2024     12/05/2024    CO2 30 12/05/2024    ANIONGAP 7 07/29/2015    BUN 26 (H) 12/05/2024    CREATININE 0.58 (L) 12/05/2024    GLUCOSE 88 07/29/2015    AST 26 12/05/2024    ALT 20 12/05/2024    BILITOT 0.49 07/29/2015    EGFR 94 12/05/2024       Lipid Profile:   Lab Results   Component Value Date    CHOL 182 07/29/2015     Lab Results   Component Value Date    HDL 68 12/05/2024     Lab Results   Component Value Date    LDLCALC 97 12/05/2024     Lab Results   Component Value Date    TRIG 63 12/05/2024       Cardiac testing:   EKG reviewed personally: Afib 92 RBBB [id number]

## 2025-03-19 DIAGNOSIS — I48.20 CHRONIC ATRIAL FIBRILLATION (HCC): ICD-10-CM

## 2025-03-19 DIAGNOSIS — I48.91 ATRIAL FIBRILLATION, UNSPECIFIED TYPE (HCC): ICD-10-CM

## 2025-03-20 RX ORDER — METOPROLOL SUCCINATE 25 MG/1
25 TABLET, EXTENDED RELEASE ORAL DAILY
Qty: 100 TABLET | Refills: 1 | Status: SHIPPED | OUTPATIENT
Start: 2025-03-20

## 2025-03-20 RX ORDER — APIXABAN 5 MG/1
5 TABLET, FILM COATED ORAL 2 TIMES DAILY
Qty: 180 TABLET | Refills: 1 | Status: SHIPPED | OUTPATIENT
Start: 2025-03-20

## 2025-04-14 ENCOUNTER — OFFICE VISIT (OUTPATIENT)
Dept: AUDIOLOGY | Age: 85
End: 2025-04-14

## 2025-04-14 DIAGNOSIS — H90.3 SENSORY HEARING LOSS, BILATERAL: Primary | ICD-10-CM

## 2025-04-14 NOTE — PROGRESS NOTES
Hearing Aid Visit:    Name:  Guero Hinds  :  1940  Age:  84 y.o.  MRN:  [id number]  Date of Evaluation: 25     HISTORY:    Guero Hinds was seen today (2025) for a(n) in-warranty hearing aid check of his bilateral hearing aids. Today, Guero reports overall benefit from the hearing aids. He does report that he takes the hearing aids out in restaurants.       DEVICE INFORMATION:         Left Device Right Device     Hearing Aid Make: [employer]  [employer]    Hearing Aid Model: Real 2 miniRITE-R Real 2 miniRITE-R   Serial Number: [id number]   Repair Warranty Date: 2027   Loss/Damage Warranty Status: Active  Active         Length/Output 3X85 3X85   Wax System: Pro Wax miniFIT Pro Wax miniFIT   Dome Size/Style: 10 mm DB 10 mm DB   Battery: Lithium-ion Rechargeable Lithium-ion Rechargeable       [employer] Serial Number: N/A N/A   [employer] Warranty Date:  N/A N/A    Serial Number:  [id number]    Warranty Date:  2027     Accessories: N/A             ACTION/ADJUSTMENTS:    Visual inspection of the device(s) revealed no noticeable damage or defects.     A listening check revealed good sound quality from the device(s).    The hearing aids were cleaned and checked. The patients wax filters and domes were replaced bilaterally.     No adjustments were made at this time.     2 packs of domes and 2 packs of wax guards were Provided.    Patient was counseled on his volume control.      RECOMMENDATIONS:     Follow up PRN      Nicola Peacock., [facility]  Clinical Audiologist  [facility]  [address]   [place] [id number]

## 2025-08-01 ENCOUNTER — OFFICE VISIT (OUTPATIENT)
Dept: CARDIOLOGY CLINIC | Facility: MEDICAL CENTER | Age: 85
End: 2025-08-01
Payer: COMMERCIAL

## 2025-08-01 VITALS
HEIGHT: 70 IN | WEIGHT: 159 LBS | SYSTOLIC BLOOD PRESSURE: 116 MMHG | DIASTOLIC BLOOD PRESSURE: 72 MMHG | BODY MASS INDEX: 22.76 KG/M2 | HEART RATE: 90 BPM | OXYGEN SATURATION: 99 %

## 2025-08-01 DIAGNOSIS — I48.19 PERSISTENT ATRIAL FIBRILLATION (HCC): ICD-10-CM

## 2025-08-01 DIAGNOSIS — I34.0 NONRHEUMATIC MITRAL VALVE REGURGITATION: Primary | ICD-10-CM

## 2025-08-01 PROCEDURE — 99214 OFFICE O/P EST MOD 30 MIN: CPT | Performed by: INTERNAL MEDICINE

## 2025-08-01 PROCEDURE — 93000 ELECTROCARDIOGRAM COMPLETE: CPT | Performed by: INTERNAL MEDICINE
